# Patient Record
Sex: MALE | Race: BLACK OR AFRICAN AMERICAN | NOT HISPANIC OR LATINO | Employment: UNEMPLOYED | URBAN - METROPOLITAN AREA
[De-identification: names, ages, dates, MRNs, and addresses within clinical notes are randomized per-mention and may not be internally consistent; named-entity substitution may affect disease eponyms.]

---

## 2017-02-03 ENCOUNTER — HOSPITAL ENCOUNTER (EMERGENCY)
Facility: HOSPITAL | Age: 52
Discharge: HOME | End: 2017-02-03
Attending: EMERGENCY MEDICINE | Admitting: EMERGENCY MEDICINE

## 2017-02-03 ENCOUNTER — APPOINTMENT (OUTPATIENT)
Dept: GENERAL RADIOLOGY | Facility: HOSPITAL | Age: 52
End: 2017-02-03

## 2017-02-03 ENCOUNTER — APPOINTMENT (OUTPATIENT)
Dept: CT IMAGING | Facility: HOSPITAL | Age: 52
End: 2017-02-03

## 2017-02-03 VITALS
HEIGHT: 65 IN | RESPIRATION RATE: 18 BRPM | OXYGEN SATURATION: 99 % | SYSTOLIC BLOOD PRESSURE: 150 MMHG | HEART RATE: 72 BPM | TEMPERATURE: 98.3 F | BODY MASS INDEX: 30.82 KG/M2 | DIASTOLIC BLOOD PRESSURE: 88 MMHG | WEIGHT: 185 LBS
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
## 2017-02-03 DIAGNOSIS — S16.1XXA CERVICAL STRAIN, ACUTE, INITIAL ENCOUNTER: ICD-10-CM

## 2017-02-03 DIAGNOSIS — M54.9 ACUTE BACK PAIN, UNSPECIFIED BACK LOCATION, UNSPECIFIED BACK PAIN LATERALITY: ICD-10-CM

## 2017-02-03 PROCEDURE — 72125 CT NECK SPINE W/O DYE: CPT

## 2017-02-03 PROCEDURE — 72100 X-RAY EXAM L-S SPINE 2/3 VWS: CPT

## 2017-02-03 PROCEDURE — 99283 EMERGENCY DEPT VISIT LOW MDM: CPT

## 2017-02-03 RX ORDER — NAPROXEN 250 MG/1
500 TABLET ORAL ONCE
Status: COMPLETED | OUTPATIENT
Start: 2017-02-03 | End: 2017-02-03

## 2017-02-03 RX ORDER — HYDROCODONE BITARTRATE AND ACETAMINOPHEN 5; 325 MG/1; MG/1
1 TABLET ORAL EVERY 6 HOURS PRN
Qty: 10 TABLET | Refills: 0 | Status: SHIPPED | OUTPATIENT
Start: 2017-02-03 | End: 2021-04-28

## 2017-02-03 RX ADMIN — NAPROXEN 500 MG: 250 TABLET ORAL at 11:20

## 2017-02-03 NOTE — DISCHARGE INSTRUCTIONS
Return if problems. Information regarding Risks and Benefits When using Opioids and Other Controlled Substances to include Storage and Disposal of Medications    When considering the use of opioids and other controlled substances for the control of pain, anxiety, or for other medical purposes, you need to know of not only the benefits of these drugs but also of potential risks in using these drugs. These drugs, as well as more drugs, have beneficial uses; which is why their use is being considered in your   care, but they have risks involved in their use, too.    Opioids:  Opioids such as hydrocodone, oxycodone, hydromorphone, and codeine are pain relieving drugs, some more potent than others. They are most useful for moderate to more severe painful conditions. Risks include sedation, loss of coordination, decreased concentration, and decreased breathing with possibility of loss of consciousness or even death, especially if used in doses higher than prescribed. Improper usage can lead to addiction, tolerance, or overdose. In addition, many of these drugs are combined with acetaminophen (Tylenol) which can damage or destroy our liver when used excessively.  Alternatives to opioids are useful for mild to moderate pain and include ibuprofen (Motrin), naproxen (Aleve), aspirin, and acetaminophen (Tylenol). As with other drugs, these medications should be used according to directions on the label or from your doctor, as overuse can cause harm.    Benzodiazepines:  This group of drugs include: alprazolam (Xanax), diazepam (Valium), lorazepam (Ativan), and clonazepam (Klonopin). These drugs are used to control anxiety symptoms including anxiety and panic attacks. Risks using these drugs include: sedation, loss of coordination, decreased ability to concentrate, effects on memory, and decreased breathing with possibility of loss of consciousness or even death. Improper and prolonged usage can lead to addiction. An  alternative without addiction potential is hydroxyzine (Vistrail).    Other Controlled Substance:  This group includes Soma, Tramadol, stimulant drugs such as Ritalin, and others. Stimulant drugs are not medications that are prescribed by ER doctors. Soma and Tramadol have sedative and addictive affects similar to opioids with the same dangers mentioned with them.    Overdose:  If you or someone else are concerned that overdose has occurred, call 911 for transportation to the nearest hospital.    Storage and Disposal:  All medications need to be kept out of the reach of children or adults who cannot manage their own medicines. In addition, controlled substances can be targeted by criminals so extra precautions need to be taken to keep them in a safe, secure place. Any unused medications should be disposed of by flushing them down the toilet in the home setting or contact your local pharmacy.
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
## 2020-12-22 ENCOUNTER — APPOINTMENT (OUTPATIENT)
Dept: GENERAL RADIOLOGY | Facility: HOSPITAL | Age: 55
End: 2020-12-22

## 2020-12-22 ENCOUNTER — HOSPITAL ENCOUNTER (EMERGENCY)
Facility: HOSPITAL | Age: 55
Discharge: HOME | End: 2020-12-22
Attending: EMERGENCY MEDICINE | Admitting: EMERGENCY MEDICINE

## 2020-12-22 VITALS
SYSTOLIC BLOOD PRESSURE: 172 MMHG | OXYGEN SATURATION: 99 % | RESPIRATION RATE: 16 BRPM | HEART RATE: 79 BPM | HEIGHT: 65 IN | BODY MASS INDEX: 32.49 KG/M2 | DIASTOLIC BLOOD PRESSURE: 94 MMHG | TEMPERATURE: 97.7 F | WEIGHT: 195 LBS

## 2020-12-22 DIAGNOSIS — S39.012A LUMBAR STRAIN, INITIAL ENCOUNTER: Primary | ICD-10-CM
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
## 2020-12-22 DIAGNOSIS — R51.9 ACUTE NONINTRACTABLE HEADACHE, UNSPECIFIED HEADACHE TYPE: ICD-10-CM

## 2020-12-22 PROCEDURE — 72100 X-RAY EXAM L-S SPINE 2/3 VWS: CPT

## 2020-12-22 PROCEDURE — 99283 EMERGENCY DEPT VISIT LOW MDM: CPT

## 2020-12-22 RX ORDER — IBUPROFEN 800 MG/1
800 TABLET ORAL
Qty: 15 TABLET | Refills: 0 | Status: SHIPPED | OUTPATIENT
Start: 2020-12-22 | End: 2021-04-28

## 2020-12-22 RX ORDER — ACETAMINOPHEN 500 MG
1000 TABLET ORAL ONCE
Status: COMPLETED | OUTPATIENT
Start: 2020-12-22 | End: 2020-12-22

## 2020-12-22 RX ORDER — NAPROXEN 250 MG/1
500 TABLET ORAL ONCE
Status: COMPLETED | OUTPATIENT
Start: 2020-12-22 | End: 2020-12-22

## 2020-12-22 RX ORDER — ACETAMINOPHEN 500 MG
500 TABLET ORAL ONCE
Status: DISCONTINUED | OUTPATIENT
Start: 2020-12-22 | End: 2020-12-22 | Stop reason: HOSPADM

## 2020-12-22 RX ORDER — CYCLOBENZAPRINE HCL 10 MG
10 TABLET ORAL 3 TIMES DAILY PRN
Qty: 15 TABLET | Refills: 0 | Status: SHIPPED | OUTPATIENT
Start: 2020-12-22

## 2020-12-22 RX ADMIN — NAPROXEN 500 MG: 250 TABLET ORAL at 16:45

## 2020-12-22 RX ADMIN — ACETAMINOPHEN 1000 MG: 500 TABLET ORAL at 16:45
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
## 2021-04-28 ENCOUNTER — LAB (OUTPATIENT)
Dept: LAB | Facility: HOSPITAL | Age: 56
End: 2021-04-28

## 2021-04-28 ENCOUNTER — OFFICE VISIT (OUTPATIENT)
Dept: NEUROLOGY | Facility: CLINIC | Age: 56
End: 2021-04-28

## 2021-04-28 VITALS
SYSTOLIC BLOOD PRESSURE: 150 MMHG | BODY MASS INDEX: 33.32 KG/M2 | OXYGEN SATURATION: 98 % | DIASTOLIC BLOOD PRESSURE: 80 MMHG | WEIGHT: 200 LBS | HEIGHT: 65 IN | TEMPERATURE: 96.9 F | HEART RATE: 82 BPM

## 2021-04-28 DIAGNOSIS — G43.719 INTRACTABLE CHRONIC MIGRAINE WITHOUT AURA AND WITHOUT STATUS MIGRAINOSUS: ICD-10-CM

## 2021-04-28 DIAGNOSIS — G43.719 INTRACTABLE CHRONIC MIGRAINE WITHOUT AURA AND WITHOUT STATUS MIGRAINOSUS: Primary | ICD-10-CM

## 2021-04-28 DIAGNOSIS — R42 DIZZINESS: ICD-10-CM

## 2021-04-28 PROCEDURE — 99203 OFFICE O/P NEW LOW 30 MIN: CPT | Performed by: NURSE PRACTITIONER

## 2021-04-28 RX ORDER — LIDOCAINE 50 MG/G
PATCH TOPICAL
COMMUNITY
Start: 2021-04-16

## 2021-04-28 RX ORDER — CHLORTHALIDONE 25 MG/1
25 TABLET ORAL DAILY
COMMUNITY
Start: 2021-03-20

## 2021-04-28 RX ORDER — ATORVASTATIN CALCIUM 20 MG/1
20 TABLET, FILM COATED ORAL NIGHTLY
COMMUNITY
Start: 2021-04-20

## 2021-04-28 RX ORDER — FLUTICASONE PROPIONATE 50 MCG
2 SPRAY, SUSPENSION (ML) NASAL DAILY
COMMUNITY
Start: 2021-04-16

## 2021-04-28 RX ORDER — VERAPAMIL HYDROCHLORIDE 240 MG/1
240 CAPSULE, EXTENDED RELEASE ORAL DAILY
COMMUNITY
Start: 2021-02-22

## 2021-04-28 RX ORDER — SUMATRIPTAN 100 MG/1
TABLET, FILM COATED ORAL
COMMUNITY
Start: 2021-04-16 | End: 2022-03-09
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
## 2021-04-29 LAB
ALBUMIN SERPL-MCNC: 4.3 G/DL (ref 3.5–5.2)
ALBUMIN/GLOB SERPL: 2 G/DL
ALP SERPL-CCNC: 74 U/L (ref 39–117)
ALT SERPL-CCNC: 21 U/L (ref 1–41)
AST SERPL-CCNC: 21 U/L (ref 1–40)
BILIRUB SERPL-MCNC: 0.2 MG/DL (ref 0–1.2)
BUN SERPL-MCNC: 16 MG/DL (ref 6–20)
BUN/CREAT SERPL: 15.7 (ref 7–25)
CALCIUM SERPL-MCNC: 9.4 MG/DL (ref 8.6–10.5)
CHLORIDE SERPL-SCNC: 105 MMOL/L (ref 98–107)
CO2 SERPL-SCNC: 23.9 MMOL/L (ref 22–29)
CREAT SERPL-MCNC: 1.02 MG/DL (ref 0.76–1.27)
CRP SERPL-MCNC: <0.3 MG/DL (ref 0–0.5)
ERYTHROCYTE [SEDIMENTATION RATE] IN BLOOD BY WESTERGREN METHOD: 9 MM/HR (ref 0–20)
GLOBULIN SER CALC-MCNC: 2.1 GM/DL
GLUCOSE SERPL-MCNC: 74 MG/DL (ref 65–99)
POTASSIUM SERPL-SCNC: 4.6 MMOL/L (ref 3.5–5.2)
PROT SERPL-MCNC: 6.4 G/DL (ref 6–8.5)
SODIUM SERPL-SCNC: 139 MMOL/L (ref 136–145)

## 2021-05-04 ENCOUNTER — TELEPHONE (OUTPATIENT)
Dept: NEUROLOGY | Facility: CLINIC | Age: 56
End: 2021-05-04

## 2021-05-04 NOTE — TELEPHONE ENCOUNTER
Provider: HUTCHINSON  Caller: PT  Relationship to Patient: SELF  Phone Number: [phone]  Reason for Call: PT R/Y/C    PLEASE CALL    THANK YOU.

## 2021-06-02 ENCOUNTER — HOSPITAL ENCOUNTER (OUTPATIENT)
Dept: MRI IMAGING | Facility: HOSPITAL | Age: 56
Discharge: HOME | End: 2021-06-02

## 2021-06-02 DIAGNOSIS — G43.719 INTRACTABLE CHRONIC MIGRAINE WITHOUT AURA AND WITHOUT STATUS MIGRAINOSUS: ICD-10-CM

## 2021-06-02 DIAGNOSIS — R42 DIZZINESS: ICD-10-CM

## 2021-06-02 PROCEDURE — 70544 MR ANGIOGRAPHY HEAD W/O DYE: CPT

## 2021-06-02 PROCEDURE — 70551 MRI BRAIN STEM W/O DYE: CPT

## 2021-06-08 ENCOUNTER — TELEPHONE (OUTPATIENT)
Dept: NEUROLOGY | Facility: CLINIC | Age: 56
End: 2021-06-08

## 2021-06-08 NOTE — TELEPHONE ENCOUNTER
----- Message from DELVIN Tang sent at 6/7/2021  7:52 AM EDT -----  Please let patient know both MRI of the brain and MRA of the brain read as normal per radiology

## 2021-08-30 ENCOUNTER — OFFICE VISIT (OUTPATIENT)
Dept: NEUROLOGY | Facility: CLINIC | Age: 56
End: 2021-08-30

## 2021-08-30 VITALS
SYSTOLIC BLOOD PRESSURE: 150 MMHG | HEIGHT: 65 IN | BODY MASS INDEX: 33.49 KG/M2 | TEMPERATURE: 97.2 F | HEART RATE: 79 BPM | DIASTOLIC BLOOD PRESSURE: 80 MMHG | WEIGHT: 201 LBS | OXYGEN SATURATION: 98 %

## 2021-08-30 DIAGNOSIS — G43.719 INTRACTABLE CHRONIC MIGRAINE WITHOUT AURA AND WITHOUT STATUS MIGRAINOSUS: ICD-10-CM

## 2021-08-30 PROCEDURE — 99213 OFFICE O/P EST LOW 20 MIN: CPT | Performed by: NURSE PRACTITIONER

## 2021-08-30 RX ORDER — LORATADINE 10 MG/1
10 TABLET ORAL DAILY
COMMUNITY
Start: 2021-07-07 | End: 2022-03-09

## 2021-08-30 RX ORDER — AMITRIPTYLINE HYDROCHLORIDE 10 MG/1
10 TABLET, FILM COATED ORAL NIGHTLY
Qty: 30 TABLET | Refills: 3 | Status: SHIPPED | OUTPATIENT
Start: 2021-08-30 | End: 2021-12-30

## 2021-08-30 NOTE — PROGRESS NOTES
Subjective:     Patient ID: Miky Curiel is a 55 y.o. male.    CC:   Chief Complaint   Patient presents with   • Migraine       HPI:   History of Present Illness     Mr. Curiel is here today for follow up.  I first saw 4/28/2021 for headaches    He told me that he has had headaches ongoing for a couple years at least.  He had been told in the past that these were migraine and possibly cluster headaches.  He said that typically when he has a headache he will have pain behind either eye that will start as a throbbing sensation and then spread to holoacranial discomfort.  He at times also has associated photo and phonophobia but he never has any nausea or vomiting.   his headaches can be episodic, he may go a month or so without having any headaches, he may have 1 or 2 headaches per month and he may have 3 to 4-week.  He had never identified any patterns or triggers to his headaches.  He had not noticed that his headaches are worsened during certain months of the year.  He does at times have watering eyes but he has this bilaterally as he complains of chronic allergies.  He has been on verapamil for quite some time for both blood pressure and headache control, a couple months ago his primary care did raise the dose of his verapamil to 240 mg daily.  He had also taken propranolol in the past.  He continue to have sporadic headaches and he had not really noticed much of an improvement overall.  He takes sumatriptan as needed for his headaches, he is prescribed 100 mg pills but he breaks these in thirds.  Typically one third of 100 mg sumatriptan will abort his headache.  He denied any ptosis or facial droop associated with his headaches.  He has had an eye exam recently and was told everything was normal  He has occasional dizziness but this is rare.  He denied any confusional episodes, seizures, dysarthria or stroke symptoms.  He has some tingling in his hands that has been ongoing for quite some time, he had a  previous EMG that apparently showed carpal tunnel, he does not really wear his wrist splints often.  He denied any syncopal episodes, tremor weakness or falls.  After last visit I did order MRI of the brain, this has since been done as well as MRA and both were read as normal per radiology.  I sent in a prescription for Emgality for him, I was unaware until he returns today that this was denied by his insurance.  He continues to have headaches with no changes.  He denies any new symptoms     The following portions of the patient's history were reviewed and updated as appropriate: allergies, current medications, past family history, past medical history, past social history, past surgical history and problem list.    Past Medical History:   Diagnosis Date   • Cluster headache    • Hyperlipidemia    • Hypertension    • Kidney stone        Past Surgical History:   Procedure Laterality Date   • ANKLE SURGERY     • CYSTOSCOPY BLADDER STONE LITHOTRIPSY         Social History     Socioeconomic History   • Marital status: Single     Spouse name: Not on file   • Number of children: Not on file   • Years of education: Not on file   • Highest education level: Not on file   Tobacco Use   • Smoking status: Light Tobacco Smoker     Types: Cigars   • Smokeless tobacco: Never Used   Vaping Use   • Vaping Use: Never used   Substance and Sexual Activity   • Alcohol use: No   • Drug use: No   • Sexual activity: Yes       Family History   Problem Relation Age of Onset   • Cancer Mother    • Dementia Father    • Hypertension Father    • Hyperlipidemia Father    • Heart failure Father         Review of Systems   Constitutional: Negative.    Eyes: Negative.    Respiratory: Negative.    Cardiovascular: Negative.    Gastrointestinal: Negative.    Endocrine: Negative.    Genitourinary: Negative.    Musculoskeletal: Negative.    Skin: Negative.    Allergic/Immunologic: Negative.    Neurological: Positive for headaches. Negative for dizziness,  "tremors, seizures, syncope, facial asymmetry, speech difficulty, weakness and light-headedness.   Hematological: Negative.    Psychiatric/Behavioral: Negative.         Objective:  /80   Pulse 79   Temp 97.2 °F (36.2 °C)   Ht 165.1 cm (65\")   Wt 91.2 kg (201 lb)   SpO2 98%   BMI 33.45 kg/m²     Neurologic Exam     Mental Status   Oriented to person, place, and time.   Patient is alert and oriented, speech clear and articulate     Cranial Nerves   Cranial nerves II through XII intact.     CN III, IV, VI   Pupils are equal, round, and reactive to light.    Motor Exam   Muscle bulk: normal  Overall muscle tone: normal  Right arm pronator drift: absent  Left arm pronator drift: absent    Strength   Strength 5/5 throughout.     Gait, Coordination, and Reflexes     Gait  Gait: normal    Coordination   Romberg: negative  Finger to nose coordination: normal    Tremor   Resting tremor: absent  Intention tremor: absent  Action tremor: absent    Reflexes   Reflexes 2+ except as noted.       Physical Exam  Vitals reviewed.   Constitutional:       General: He is not in acute distress.     Appearance: He is well-developed. He is obese. He is not ill-appearing or toxic-appearing.   HENT:      Head: Normocephalic and atraumatic.      Mouth/Throat:      Mouth: Mucous membranes are moist.   Eyes:      General: No scleral icterus.     Extraocular Movements: Extraocular movements intact.      Conjunctiva/sclera: Conjunctivae normal.      Pupils: Pupils are equal, round, and reactive to light.   Pulmonary:      Effort: Pulmonary effort is normal. No respiratory distress.   Musculoskeletal:      Cervical back: Normal range of motion and neck supple.   Skin:     General: Skin is warm.      Capillary Refill: Capillary refill takes less than 2 seconds.   Neurological:      General: No focal deficit present.      Mental Status: He is alert and oriented to person, place, and time.      Coordination: Finger-Nose-Finger Test and " Romberg Test normal.      Gait: Gait is intact.      Deep Tendon Reflexes: Strength normal.   Psychiatric:         Mood and Affect: Mood normal.         Behavior: Behavior normal.         Thought Content: Thought content normal.         Judgment: Judgment normal.         Assessment/Plan:       Diagnoses and all orders for this visit:    1. Intractable chronic migraine without aura and without status migrainosus  -     galcanezumab-gnlm (EMGALITY) 120 MG/ML auto-injector pen; Inject 1 mL under the skin into the appropriate area as directed Every 30 (Thirty) Days.  Dispense: 1.12 mL; Refill: 11    Other orders  -     amitriptyline (ELAVIL) 10 MG tablet; Take 1 tablet by mouth Every Night.  Dispense: 30 tablet; Refill: 3    Patient has migraine and some features of cluster headache.  At last visit I prescribed Emgality, it appears that our notes were not sent for approval and I was unaware of this until today.  Going to reorder Emgality for both migraine and cluster, 120 mg monthly.  He has had an MRI and MRA of the brain which were read as normal  I have encouraged him to notify me if he is unable to  the prescription, we are going to work on approval with his insurance today  Follow-up here in the clinic in 3 months or sooner if needed  We reviewed possible headache triggers, stress relief techniques, and alternative treatments for headaches. The patient was in understanding and all questions were addressed. We reviewed the diagnosis and treatment plan and medication side effect profile. The patient will follow up as scheduled.             Reviewed medications, potential side effects and signs and symptoms to report. Discussed risk versus benefits of treatment plan with patient and/or family-including medications, labs and radiology that may be ordered. Addressed questions and concerns during visit. Patient and/or family verbalized understanding and agree with plan.    AS THE PROVIDER, I PERSONALLY WORE PPE  DURING ENTIRE FACE TO FACE ENCOUNTER IN CLINIC WITH THE PATIENT. PATIENT ALSO WORE PPE DURING ENTIRE FACE TO FACE ENCOUNTER EXCEPT FOR A MAX OF 30 SECONDS DURING NEUROLOGICAL EVALUATION OF CRANIAL NERVES AND THEN MASK WAS PLACED BACK OVER PATIENT FACE FOR REMAINDER OF VISIT. I WASHED MY HANDS BEFORE AND AFTER VISIT.    During this visit the following were done:  Labs Reviewed []    Labs Ordered []    Radiology Reports Reviewed []    Radiology Ordered []    PCP Records Reviewed []    Referring Provider Records Reviewed []    ER Records Reviewed []    Hospital Records Reviewed []    History Obtained From Family []    Radiology Images Reviewed []    Other Reviewed []    Records Requested []      Edwina Garg, DELVIN  8/30/2021

## 2021-08-31 ENCOUNTER — PRIOR AUTHORIZATION (OUTPATIENT)
Dept: NEUROLOGY | Facility: CLINIC | Age: 56
End: 2021-08-31

## 2021-08-31 NOTE — TELEPHONE ENCOUNTER
Pa submitted for Emgality KEY: [id number]   Per Emili he has tried and failed propranolol, lisinopril, verapamil, and is currently on amitriptyline.

## 2021-09-29 NOTE — TELEPHONE ENCOUNTER
----- Message from DELVIN Tang sent at 4/30/2021  8:41 AM EDT -----  Please let patient know his labs were in normal range.  His inflammatory markers were unremarkable   Trilobed Flap Text: The defect edges were debeveled with a #15 scalpel blade.  Given the location of the defect and the proximity to free margins a trilobed flap was deemed most appropriate.  Using a sterile surgical marker, an appropriate trilobed flap drawn around the defect.    The area thus outlined was incised deep to adipose tissue with a #15 scalpel blade.  The skin margins were undermined to an appropriate distance in all directions utilizing iris scissors.

## 2021-11-09 ENCOUNTER — OFFICE VISIT (OUTPATIENT)
Dept: NEUROLOGY | Facility: CLINIC | Age: 56
End: 2021-11-09

## 2021-11-09 VITALS
OXYGEN SATURATION: 99 % | BODY MASS INDEX: 33.32 KG/M2 | TEMPERATURE: 98.3 F | HEART RATE: 91 BPM | DIASTOLIC BLOOD PRESSURE: 80 MMHG | WEIGHT: 200 LBS | HEIGHT: 65 IN | SYSTOLIC BLOOD PRESSURE: 130 MMHG

## 2021-11-09 DIAGNOSIS — G43.709 CHRONIC MIGRAINE WITHOUT AURA WITHOUT STATUS MIGRAINOSUS, NOT INTRACTABLE: Primary | ICD-10-CM

## 2021-11-09 DIAGNOSIS — R20.2 PARESTHESIA OF LEFT UPPER LIMB: ICD-10-CM

## 2021-11-09 PROCEDURE — 99213 OFFICE O/P EST LOW 20 MIN: CPT | Performed by: NURSE PRACTITIONER

## 2021-11-09 RX ORDER — LISINOPRIL 20 MG/1
20 TABLET ORAL DAILY
COMMUNITY
Start: 2021-10-23

## 2021-11-09 RX ORDER — SUMATRIPTAN 25 MG/1
25 TABLET, FILM COATED ORAL ONCE AS NEEDED
COMMUNITY
Start: 2021-10-26 | End: 2022-12-01

## 2021-11-09 NOTE — PROGRESS NOTES
Subjective:     Patient ID: Miky Curiel is a 55 y.o. male.    CC:   Chief Complaint   Patient presents with   • Migraine       HPI:   History of Present Illness     Mr. Curiel is here today for follow up.  I first saw 4/28/2021 for headaches, his last visit was 8/30/21     He told me that he has had headaches ongoing for a couple years at least.  He had been told in the past that these were migraine and possibly cluster headaches.  He said that typically when he has a headache he will have pain behind either eye that will start as a throbbing sensation and then spread to holoacranial discomfort.  He at times also has associated photo and phonophobia but he never has any nausea or vomiting.   his headaches can be episodic, he may go a month or so without having any headaches, he may have 1 or 2 headaches per month and he may have 3 to 4-week.  He had never identified any patterns or triggers to his headaches.  He had not noticed that his headaches are worsened during certain months of the year.  He does at times have watering eyes but he has this bilaterally as he complains of chronic allergies.  He has been on verapamil for quite some time for both blood pressure and headache control, a couple months ago his primary care did raise the dose of his verapamil to 240 mg daily.  He had also taken propranolol in the past.  He continued to have sporadic headaches and he had not really noticed much of an improvement overall.  He takes sumatriptan as needed for his headaches, he is prescribed 100 mg pills but he breaks these in thirds.  Typically one third of 100 mg sumatriptan will abort his headache.  He denied any ptosis or facial droop associated with his headaches.  He has had an eye exam recently and was told everything was normal  He has occasional dizziness but this is rare.  He denied any confusional episodes, seizures, dysarthria or stroke symptoms.      He has some tingling in his hands that has been  ongoing for quite some time, he had a previous EMG that apparently showed carpal tunnel, he does not really wear his wrist splints often.  He denied any syncopal episodes, tremor weakness or falls.    I did order MRI of the brain, this has since been done as well as MRA and both were read as normal per radiology.    Fortunately at last visit he had not started Emgality injections that I prescribed at first visit due to an insurance issue.  We did get this approved, he comes in today telling me that he took 1 injection and felt significantly better, his only had 4 mild migraines since he was here last.  Unfortunately his last injection the pen had a malfunction, he has brought that pen into me today it appears it will not come out of the locked position.  He does feel like the medication has been very helpful.       He is complaining of a bit more tingling in his left arm, this has been an ongoing issue for few years, he had an EMG about 4 to 5 years ago that showed mild carpal tunnel but he has been experiencing more tingling up above his elbow as well, no weakness in the hand      The following portions of the patient's history were reviewed and updated as appropriate: allergies, current medications, past family history, past medical history, past social history, past surgical history and problem list.    Past Medical History:   Diagnosis Date   • Cluster headache    • Hyperlipidemia    • Hypertension    • Kidney stone        Past Surgical History:   Procedure Laterality Date   • ANKLE SURGERY     • CYSTOSCOPY BLADDER STONE LITHOTRIPSY         Social History     Socioeconomic History   • Marital status: Single   Tobacco Use   • Smoking status: Light Tobacco Smoker     Types: Cigars   • Smokeless tobacco: Never Used   Vaping Use   • Vaping Use: Never used   Substance and Sexual Activity   • Alcohol use: No   • Drug use: No   • Sexual activity: Yes       Family History   Problem Relation Age of Onset   • Cancer  "Mother    • Dementia Father    • Hypertension Father    • Hyperlipidemia Father    • Heart failure Father         Review of Systems   Constitutional: Negative.    Eyes: Negative.    Respiratory: Negative.    Cardiovascular: Negative.    Gastrointestinal: Negative.    Genitourinary: Negative.    Musculoskeletal: Negative.    Skin: Negative.    Allergic/Immunologic: Negative.    Neurological: Positive for numbness and headaches. Negative for dizziness, tremors, seizures, syncope, facial asymmetry, speech difficulty, weakness and light-headedness.   Hematological: Negative.    Psychiatric/Behavioral: Negative.         Objective:  /80   Pulse 91   Temp 98.3 °F (36.8 °C)   Ht 165.1 cm (65\")   Wt 90.7 kg (200 lb)   SpO2 99%   BMI 33.28 kg/m²     Neurologic Exam     Mental Status   Oriented to person, place, and time.   Patient is alert and oriented, speech clear and articulate     Cranial Nerves   Cranial nerves II through XII intact.     CN III, IV, VI   Pupils are equal, round, and reactive to light.    Motor Exam   Muscle bulk: normal  Right arm pronator drift: absent  Left arm pronator drift: absent    Strength   Strength 5/5 throughout.     Gait, Coordination, and Reflexes     Gait  Gait: normal    Coordination   Finger to nose coordination: normal    Tremor   Resting tremor: absent  Intention tremor: absent  Action tremor: absent    Reflexes   Reflexes 2+ except as noted. Positive Tinel left wrist, negative Tinel left elbow, no  strength weakness on the left       Physical Exam  Vitals reviewed.   Constitutional:       General: He is not in acute distress.     Appearance: He is well-developed. He is obese. He is not ill-appearing or toxic-appearing.   HENT:      Head: Normocephalic and atraumatic.      Mouth/Throat:      Mouth: Mucous membranes are moist.   Eyes:      General: No scleral icterus.     Extraocular Movements: Extraocular movements intact.      Conjunctiva/sclera: Conjunctivae normal.    "   Pupils: Pupils are equal, round, and reactive to light.   Pulmonary:      Effort: Pulmonary effort is normal. No respiratory distress.   Musculoskeletal:      Cervical back: Normal range of motion and neck supple.   Skin:     General: Skin is warm.      Capillary Refill: Capillary refill takes less than 2 seconds.   Neurological:      Mental Status: He is alert and oriented to person, place, and time.      Coordination: Finger-Nose-Finger Test normal.      Gait: Gait is intact.      Deep Tendon Reflexes: Strength normal.   Psychiatric:         Mood and Affect: Mood normal.         Behavior: Behavior normal.         Thought Content: Thought content normal.         Judgment: Judgment normal.         Assessment/Plan:       Diagnoses and all orders for this visit:    1. Chronic migraine without aura without status migrainosus, not intractable (Primary)  Comments:  Continue Emgality, sample given him today lot number [id number] expiration 1/2023      2. Paresthesia of left upper limb  -     EMG & Nerve Conduction Test; Future    Mr. Curiel feels like the Emgality has been very beneficial, he is only had 1 injection, his last auto injector malfunctioned, I have given him a sample today to make up for that dose, he will continue monthly injections  Is having a bit more tingling in his left hand and arm, this has been a long-term issue but seems to be more pronounced at times, will check EMG left upper extremity  We will see him back in about 4 months or sooner if needed, if he has any questions concerns or problems prior to follow-up appointment he is encouraged to notify our office ASAP         Reviewed medications, potential side effects and signs and symptoms to report. Discussed risk versus benefits of treatment plan with patient and/or family-including medications, labs and radiology that may be ordered. Addressed questions and concerns during visit. Patient and/or family verbalized understanding and agree with  plan.    AS THE PROVIDER, I PERSONALLY WORE PPE DURING ENTIRE FACE TO FACE ENCOUNTER IN CLINIC WITH THE PATIENT. PATIENT ALSO WORE PPE DURING ENTIRE FACE TO FACE ENCOUNTER EXCEPT FOR A MAX OF 30 SECONDS DURING NEUROLOGICAL EVALUATION OF CRANIAL NERVES AND THEN MASK WAS PLACED BACK OVER PATIENT FACE FOR REMAINDER OF VISIT. I WASHED MY HANDS BEFORE AND AFTER VISIT.    During this visit the following were done:  Labs Reviewed []    Labs Ordered []    Radiology Reports Reviewed []    Radiology Ordered []    PCP Records Reviewed []    Referring Provider Records Reviewed []    ER Records Reviewed []    Hospital Records Reviewed []    History Obtained From Family []    Radiology Images Reviewed []    Other Reviewed []    Records Requested []      Edwina Garg, DELVIN  11/9/2021

## 2021-12-30 RX ORDER — AMITRIPTYLINE HYDROCHLORIDE 10 MG/1
TABLET, FILM COATED ORAL
Qty: 30 TABLET | Refills: 3 | Status: SHIPPED | OUTPATIENT
Start: 2021-12-30 | End: 2022-05-04

## 2021-12-30 NOTE — TELEPHONE ENCOUNTER
Rx Refill Note  Requested Prescriptions     Pending Prescriptions Disp Refills   • amitriptyline (ELAVIL) 10 MG tablet [Pharmacy Med Name: AMITRIPTYLINE HCL 10 MG TAB] 30 tablet 3     Sig: TAKE 1 TABLET BY MOUTH EVERY DAY AT NIGHT      Last office visit with prescribing clinician: 11/9/2021      Next office visit with prescribing clinician: 3/9/2022            Mickie Esteban MA  12/30/21, 12:56 EST

## 2022-01-11 ENCOUNTER — HOSPITAL ENCOUNTER (OUTPATIENT)
Dept: NEUROLOGY | Facility: HOSPITAL | Age: 57
Discharge: HOME | End: 2022-01-11
Admitting: NURSE PRACTITIONER

## 2022-01-11 ENCOUNTER — APPOINTMENT (OUTPATIENT)
Dept: NEUROLOGY | Facility: HOSPITAL | Age: 57
End: 2022-01-11

## 2022-01-11 DIAGNOSIS — R20.2 PARESTHESIA OF LEFT UPPER LIMB: ICD-10-CM

## 2022-01-11 PROCEDURE — 95886 MUSC TEST DONE W/N TEST COMP: CPT

## 2022-01-11 PROCEDURE — 95909 NRV CNDJ TST 5-6 STUDIES: CPT

## 2022-01-12 ENCOUNTER — TELEPHONE (OUTPATIENT)
Dept: NEUROLOGY | Facility: CLINIC | Age: 57
End: 2022-01-12

## 2022-01-12 DIAGNOSIS — G56.03 BILATERAL CARPAL TUNNEL SYNDROME: Primary | ICD-10-CM

## 2022-01-12 NOTE — PROGRESS NOTES
Please let pt know his nerve/muscle test showed severe carpal tunnel in both hands, I would like him to see ortho, referral placed.

## 2022-01-12 NOTE — TELEPHONE ENCOUNTER
----- Message from DELVIN Tang sent at 1/12/2022  8:03 AM EST -----  Please let pt know his nerve/muscle test showed severe carpal tunnel in both hands, I would like him to see ortho, referral placed.

## 2022-01-12 NOTE — TELEPHONE ENCOUNTER
Patient notified of message and results. He is fine to have referral to ortho. I told him someone would call him to schedule this appointment

## 2022-01-17 ENCOUNTER — PRIOR AUTHORIZATION (OUTPATIENT)
Dept: NEUROLOGY | Facility: CLINIC | Age: 57
End: 2022-01-17

## 2022-01-18 ENCOUNTER — OFFICE VISIT (OUTPATIENT)
Dept: ORTHOPEDIC SURGERY | Facility: CLINIC | Age: 57
End: 2022-01-18

## 2022-01-18 VITALS
HEIGHT: 65 IN | DIASTOLIC BLOOD PRESSURE: 95 MMHG | SYSTOLIC BLOOD PRESSURE: 168 MMHG | WEIGHT: 205 LBS | BODY MASS INDEX: 34.16 KG/M2

## 2022-01-18 DIAGNOSIS — G56.01 CARPAL TUNNEL SYNDROME ON RIGHT: ICD-10-CM

## 2022-01-18 DIAGNOSIS — G56.02 CARPAL TUNNEL SYNDROME ON LEFT: Primary | ICD-10-CM

## 2022-01-18 DIAGNOSIS — M79.602 LEFT ARM PAIN: ICD-10-CM

## 2022-01-18 PROCEDURE — 99204 OFFICE O/P NEW MOD 45 MIN: CPT | Performed by: PHYSICIAN ASSISTANT

## 2022-01-18 NOTE — PROGRESS NOTES
[facility] Note        Subjective     Pain of the Left Wrist and Pain of the Right Wrist      HPI    Miky Curiel is a 56 y.o. male. This is a very pleasant RHD patient presenting today to discuss his left arm pain, numbness and tingling.  No trauma or injury.  It is constant but worse with driving, feels asleep at night.   Symptoms for several months.  He had EMG  With severe carpal tunnel bilaterally.  He reports minimal symptoms on the right.  Here for further evaluation and treatment recommendations     Past Medical History:   Diagnosis Date   • Cluster headache    • Hyperlipidemia    • Hypertension    • Kidney stone       Past Surgical History:   Procedure Laterality Date   • ANKLE SURGERY     • CYSTOSCOPY BLADDER STONE LITHOTRIPSY        Family History   Problem Relation Age of Onset   • Cancer Mother    • Dementia Father    • Hypertension Father    • Hyperlipidemia Father    • Heart failure Father      Social History     Socioeconomic History   • Marital status: Single   Tobacco Use   • Smoking status: Light Tobacco Smoker     Types: Cigars   • Smokeless tobacco: Never Used   Vaping Use   • Vaping Use: Never used   Substance and Sexual Activity   • Alcohol use: No   • Drug use: No   • Sexual activity: Yes      Current Outpatient Medications on File Prior to Visit   Medication Sig Dispense Refill   • amitriptyline (ELAVIL) 10 MG tablet TAKE 1 TABLET BY MOUTH EVERY DAY AT NIGHT 30 tablet 3   • atorvastatin (LIPITOR) 20 MG tablet Take 20 mg by mouth Every Night.     • chlorthalidone (HYGROTON) 25 MG tablet Take 25 mg by mouth Daily.     • cyclobenzaprine (FLEXERIL) 10 MG tablet Take 1 tablet by mouth 3 (Three) Times a Day As Needed for Muscle Spasms. 15 tablet 0   • diclofenac sodium (VOTAREN XR) 100 MG 24 hr tablet TAKE ONE TABLET BY MOUTH DAILY AS NEEDED FOR SEVERE PAIN     • fluticasone (FLONASE) 50 MCG/ACT nasal spray 2 sprays by Each Nare route Daily.     •  "galcanezumab-gnlm (EMGALITY) 120 MG/ML auto-injector pen Inject 1 mL under the skin into the appropriate area as directed Every 30 (Thirty) Days. 1.12 mL 11   • lidocaine (LIDODERM) 5 % APPLY 1 PATCH AND LEAVE IN PLACE FOR 12 HOURS, THEN REMOVE AND LEAVE OFF FOR 12 HOURS     • lisinopril (PRINIVIL,ZESTRIL) 20 MG tablet      • SUMAtriptan (IMITREX) 100 MG tablet TAKE 1 TABLET FOR MIGRAINE RELIEF. MAY REPEAT 2 HOURS LATER. MAXIMUM 200MG/DAY.     • SUMAtriptan (IMITREX) 25 MG tablet      • verapamil ER (VERELAN) 240 MG 24 hr capsule Take 240 mg by mouth Daily.     • loratadine (CLARITIN) 10 MG tablet Take 10 mg by mouth Daily.       No current facility-administered medications on file prior to visit.      No Known Allergies       Review of Systems   Constitutional: Negative.    HENT: Negative.    Eyes: Negative.    Respiratory: Negative.    Cardiovascular: Negative.    Gastrointestinal: Negative.    Endocrine: Negative.    Genitourinary: Negative.    Musculoskeletal: Positive for arthralgias.   Skin: Negative.    Allergic/Immunologic: Negative.    Neurological: Negative.    Hematological: Negative.    Psychiatric/Behavioral: Negative.         I reviewed the patient's chief complaint, history of present illness, review of systems, past medical history, surgical history, family history, social history, medications and allergy list.        Objective      Physical Exam  /95   Ht 165.1 cm (65\")   Wt 93 kg (205 lb)   BMI 34.11 kg/m²     Body mass index is 34.11 kg/m².    General  Mental Status - alert  General Appearance - cooperative, well groomed, not in acute distress  Orientation - Oriented X3  Build & Nutrition - well developed and well nourished  Posture - normal posture  Gait - normal       Ortho Exam  LUE exam:  Normal motion and strength of the shoulder, elbow, wrist and hand.  No tenderness.  Patient able tomake a composite fist.  Sensation intact to light touch throughout LUE.  Negative Tinel's at the " wrist and elbow.  Negative Phalen's.      Imaging/Studies  Imaging Results (Last 24 Hours)     ** No results found for the last 24 hours. **            Assessment    Assessment:  1. Carpal tunnel syndrome on left    2. Carpal tunnel syndrome on right    3. Left arm pain          Plan:  1. Recommend over-the-counter medication as needed for discomfort  2. Left arm pain with severe carpal tunnel syndrome.  I reviewed EMG from 1/11/22, clinical findings, past and current treatment with the patient.  His EMG shows severe carpal tunnel, no evidence of cervical radiculopathy or plexopathy.  I explained that the CT would not cause symptoms all of the way up the arm.  I explained the median nerve distribution.  We discussed treatment including trial of injection as diagnostic and therapeutic, bracing.  Plan today is left CT injection. We will get him a cock up wrist splint.  RTC 6 weeks or sooner if needed.  3. Right severe CTS.  Patient is mildly symptomatic.  He does not think he needs any treatment at this time.    I discussed with the patient the potential benefits of performing a therapeutic injection of the left carpal tunnel as well as potential risks including but not limited to infection, swelling, pain, bleeding, bruising, nerve/vessel damage, skin color changes, transient elevation in blood glucose levels, and fat atrophy. After informed consent and verifying correct patient, procedure site, and type of procedure, the area was prepped with Hibiclens, ethyl chloride was used to numb the skin. Via the volar approach, 1cc of 1% lidocaine and  20mg/ml of Kenalog were injected into the left carpal tunnel. The patient tolerated the procedure well. There were no complications.     History, diagnosis and treatment plan discussed with Dr. Salazar.                Elizabeth Houston PA-C  01/19/22  11:48 EST

## 2022-03-09 ENCOUNTER — OFFICE VISIT (OUTPATIENT)
Dept: NEUROLOGY | Facility: CLINIC | Age: 57
End: 2022-03-09

## 2022-03-09 VITALS
DIASTOLIC BLOOD PRESSURE: 88 MMHG | HEIGHT: 65 IN | WEIGHT: 203 LBS | HEART RATE: 90 BPM | TEMPERATURE: 95.3 F | OXYGEN SATURATION: 98 % | BODY MASS INDEX: 33.82 KG/M2 | SYSTOLIC BLOOD PRESSURE: 150 MMHG

## 2022-03-09 DIAGNOSIS — G43.709 CHRONIC MIGRAINE WITHOUT AURA WITHOUT STATUS MIGRAINOSUS, NOT INTRACTABLE: Primary | ICD-10-CM

## 2022-03-09 DIAGNOSIS — M54.2 NECK PAIN: ICD-10-CM

## 2022-03-09 DIAGNOSIS — R20.2 PARESTHESIA OF LEFT UPPER LIMB: ICD-10-CM

## 2022-03-09 PROCEDURE — 99213 OFFICE O/P EST LOW 20 MIN: CPT | Performed by: NURSE PRACTITIONER

## 2022-03-09 NOTE — PROGRESS NOTES
Subjective:     Patient ID: Miky Curiel is a 56 y.o. male.    CC:   Chief Complaint   Patient presents with   • Migraine       HPI:   History of Present Illness     Mr. Curiel is here today for follow up.  I first saw 4/28/2021 for headaches, his last visit was 8/30/21     He told me that he has had headaches ongoing for a couple years at least.  He had been told in the past that these were migraine and possibly cluster headaches.  He said that typically when he has a headache he will have pain behind either eye that will start as a throbbing sensation and then spread to holoacranial discomfort.  He at times also has associated photo and phonophobia but he never has any nausea or vomiting.   his headaches can be episodic, he may go a month or so without having any headaches, he may have 1 or 2 headaches per month and he may have 3 to 4-week.  He had never identified any patterns or triggers to his headaches.  He had not noticed that his headaches are worsened during certain months of the year.  He does at times have watering eyes but he has this bilaterally as he complains of chronic allergies.  He has been on verapamil for quite some time for both blood pressure and headache control, a couple months ago his primary care did raise the dose of his verapamil to 240 mg daily.  He had also taken propranolol in the past.  He continued to have sporadic headaches and he had not really noticed much of an improvement overall.  He takes sumatriptan as needed for his headaches, he is prescribed 100 mg pills but he breaks these in thirds.  Typically one third of 100 mg sumatriptan will abort his headache.  He denied any ptosis or facial droop associated with his headaches.  He has had an eye exam recently and was told everything was normal  He has occasional dizziness but this is rare.  He denied any confusional episodes, seizures, dysarthria or stroke symptoms.       He has some tingling in his left that has been  ongoing for quite some time, he had a previous EMG that apparently showed carpal tunnel, he does not really wear his wrist splints often.  He denied any syncopal episodes, tremor weakness or falls.     I did order MRI of the brain, this has since been done as well as MRA and both were read as normal per radiology.      He tells me that since starting the low-dose amitriptyline and then adding the Emgality injections his migraines and headaches are significantly improved.  He is not really having any migraines at all and may have just a couple very mild headaches per month, he has not needed Imitrex in quite some time    At last visit I did get an EMG as he was complaining of some tingling in his left arm, he had a history of carpal tunnel.  EMG confirms severe carpal tunnel, he saw Ortho who did not feel like his carpal tunnel was symptomatic.  EMG did not show evidence of radiculopathy however he continues to have some discomfort from his neck shooting down to his hand.  Denies weakness of his upper extremities, denies problems with balance or falls    He tells me he is actually feeling very well  The following portions of the patient's history were reviewed and updated as appropriate: allergies, current medications, past family history, past medical history, past social history, past surgical history and problem list.    Past Medical History:   Diagnosis Date   • Cluster headache    • Hyperlipidemia    • Hypertension    • Kidney stone        Past Surgical History:   Procedure Laterality Date   • ANKLE SURGERY     • CYSTOSCOPY BLADDER STONE LITHOTRIPSY         Social History     Socioeconomic History   • Marital status: Single   Tobacco Use   • Smoking status: Light Tobacco Smoker     Types: Cigars   • Smokeless tobacco: Never Used   Vaping Use   • Vaping Use: Never used   Substance and Sexual Activity   • Alcohol use: No   • Drug use: No   • Sexual activity: Yes       Family History   Problem Relation Age of Onset  "  • Cancer Mother    • Dementia Father    • Hypertension Father    • Hyperlipidemia Father    • Heart failure Father         Review of Systems   Constitutional: Negative.    Eyes: Negative.    Respiratory: Negative.    Cardiovascular: Negative.    Gastrointestinal: Negative.    Endocrine: Negative.    Genitourinary: Negative.    Musculoskeletal: Negative.    Skin: Negative.    Allergic/Immunologic: Negative.    Neurological: Positive for numbness (left hand) and headaches. Negative for dizziness, tremors, seizures, syncope, facial asymmetry, speech difficulty, weakness and light-headedness.   Hematological: Negative.    Psychiatric/Behavioral: Negative.         Objective:  /88   Pulse 90   Temp 95.3 °F (35.2 °C)   Ht 165.1 cm (65\")   Wt 92.1 kg (203 lb)   SpO2 98%   BMI 33.78 kg/m²     Neurologic Exam     Mental Status   Oriented to person, place, and time.   Attention: normal. Concentration: normal.   Speech: speech is normal   Level of consciousness: alert  Knowledge: consistent with education.   Able to read. Able to write. Normal comprehension.     Cranial Nerves   Cranial nerves II through XII intact.     CN II   Visual fields full to confrontation.   Right visual field deficit: none  Left visual field deficit: none     CN III, IV, VI   Pupils are equal, round, and reactive to light.  Extraocular motions are normal.   Right pupil: Shape: regular. Reactivity: brisk. Consensual response: intact. Accommodation: intact.   Left pupil: Shape: regular. Reactivity: brisk. Consensual response: intact. Accommodation: intact.   CN III: no CN III palsy  CN VI: no CN VI palsy  Nystagmus: none   Upgaze: normal  Downgaze: normal    CN V   Facial sensation intact.     CN VII   Facial expression full, symmetric.     CN VIII   CN VIII normal.     CN IX, X   CN IX normal.   CN X normal.     CN XI   CN XI normal.     CN XII   CN XII normal.     Motor Exam   Muscle bulk: normal  Overall muscle tone: normal  Right arm " tone: normal  Left arm tone: normal  Right arm pronator drift: absent  Left arm pronator drift: absent  Right leg tone: normal  Left leg tone: normal    Strength   Strength 5/5 throughout.     Sensory Exam   Light touch normal.     Gait, Coordination, and Reflexes     Gait  Gait: normal    Coordination   Finger to nose coordination: normal    Tremor   Resting tremor: absent  Intention tremor: absent  Action tremor: absent    Reflexes   Reflexes 2+ except as noted.       Physical Exam  Vitals reviewed.   Constitutional:       General: He is not in acute distress.     Appearance: He is well-developed. He is obese. He is not ill-appearing or toxic-appearing.   HENT:      Head: Normocephalic and atraumatic.      Mouth/Throat:      Mouth: Mucous membranes are moist.   Eyes:      General: No scleral icterus.     Extraocular Movements: Extraocular movements intact and EOM normal.      Conjunctiva/sclera: Conjunctivae normal.      Pupils: Pupils are equal, round, and reactive to light.   Pulmonary:      Effort: Pulmonary effort is normal. No respiratory distress.   Musculoskeletal:      Cervical back: Normal range of motion and neck supple.   Skin:     General: Skin is warm.      Capillary Refill: Capillary refill takes less than 2 seconds.   Neurological:      General: No focal deficit present.      Mental Status: He is alert and oriented to person, place, and time.      Coordination: Finger-Nose-Finger Test normal.      Gait: Gait is intact.      Deep Tendon Reflexes: Strength normal.   Psychiatric:         Mood and Affect: Mood normal.         Speech: Speech normal.         Behavior: Behavior normal.         Thought Content: Thought content normal.         Judgment: Judgment normal.         Assessment/Plan:       Diagnoses and all orders for this visit:    1. Chronic migraine without aura without status migrainosus, not intractable (Primary)  Comments:  Continue low-dose amitriptyline and Emgality injections    2.  Paresthesia of left upper limb  Comments:  EMG shows carpal tunnel, Ortho did not feel like this was symptomatic, check MRI of the neck  Orders:  -     MRI Cervical Spine Without Contrast    3. Neck pain    Mr. Curiel reports he is doing great with the low-dose amitriptyline and Emgality injections.  He has not had a full migraine in quite some time, has occasional mild headaches.  Ortho did not feel like his carpal tunnel was symptomatic, he continues to complain of some tingling in his left arm, adamantly denies any chest pain or discomfort.  Has some neck discomfort as well.  Will check MRI of the C-spine although EMG did not show evidence of radiculopathy  Exam is normal today  We will see him back here in the clinic in 6 months but I encouraged him to reach out with any questions concerns or problems prior to follow-up appointment           Reviewed medications, potential side effects and signs and symptoms to report. Discussed risk versus benefits of treatment plan with patient and/or family-including medications, labs and radiology that may be ordered. Addressed questions and concerns during visit. Patient and/or family verbalized understanding and agree with plan.    AS THE PROVIDER, I PERSONALLY WORE PPE DURING ENTIRE FACE TO FACE ENCOUNTER IN CLINIC WITH THE PATIENT. PATIENT ALSO WORE PPE DURING ENTIRE FACE TO FACE ENCOUNTER EXCEPT FOR A MAX OF 30 SECONDS DURING NEUROLOGICAL EVALUATION OF CRANIAL NERVES AND THEN MASK WAS PLACED BACK OVER PATIENT FACE FOR REMAINDER OF VISIT. I WASHED MY HANDS BEFORE AND AFTER VISIT.    During this visit the following were done:  Labs Reviewed []    Labs Ordered []    Radiology Reports Reviewed []    Radiology Ordered []    PCP Records Reviewed []    Referring Provider Records Reviewed []    ER Records Reviewed []    Hospital Records Reviewed []    History Obtained From Family []    Radiology Images Reviewed []    Other Reviewed []    Records Requested []      Edwina MARI  Britany, APRN  3/9/2022

## 2022-04-12 ENCOUNTER — HOSPITAL ENCOUNTER (OUTPATIENT)
Dept: MRI IMAGING | Facility: HOSPITAL | Age: 57
Discharge: HOME | End: 2022-04-12
Admitting: NURSE PRACTITIONER

## 2022-04-12 PROCEDURE — 72141 MRI NECK SPINE W/O DYE: CPT

## 2022-04-13 ENCOUNTER — TELEPHONE (OUTPATIENT)
Dept: NEUROLOGY | Facility: CLINIC | Age: 57
End: 2022-04-13

## 2022-04-13 DIAGNOSIS — M50.30 DDD (DEGENERATIVE DISC DISEASE), CERVICAL: Primary | ICD-10-CM

## 2022-04-13 DIAGNOSIS — N88.9 CERVICAL LESION: ICD-10-CM

## 2022-04-13 DIAGNOSIS — R20.2 ARM PARESTHESIA, LEFT: ICD-10-CM

## 2022-04-15 ENCOUNTER — TELEPHONE (OUTPATIENT)
Dept: NEUROLOGY | Facility: CLINIC | Age: 57
End: 2022-04-15

## 2022-05-02 ENCOUNTER — OFFICE VISIT (OUTPATIENT)
Dept: NEUROSURGERY | Facility: CLINIC | Age: 57
End: 2022-05-02

## 2022-05-02 VITALS
BODY MASS INDEX: 33.59 KG/M2 | SYSTOLIC BLOOD PRESSURE: 168 MMHG | DIASTOLIC BLOOD PRESSURE: 96 MMHG | WEIGHT: 201.6 LBS | RESPIRATION RATE: 18 BRPM | HEART RATE: 82 BPM | HEIGHT: 65 IN

## 2022-05-02 DIAGNOSIS — G89.29 CHRONIC NECK PAIN: Primary | ICD-10-CM

## 2022-05-02 DIAGNOSIS — E66.09 CLASS 1 OBESITY DUE TO EXCESS CALORIES WITH SERIOUS COMORBIDITY AND BODY MASS INDEX (BMI) OF 33.0 TO 33.9 IN ADULT: ICD-10-CM

## 2022-05-02 DIAGNOSIS — M54.2 CHRONIC NECK PAIN: Primary | ICD-10-CM

## 2022-05-02 DIAGNOSIS — E78.5 DYSLIPIDEMIA: ICD-10-CM

## 2022-05-02 DIAGNOSIS — I10 ESSENTIAL HYPERTENSION: ICD-10-CM

## 2022-05-02 PROCEDURE — 99204 OFFICE O/P NEW MOD 45 MIN: CPT | Performed by: NEUROLOGICAL SURGERY

## 2022-05-02 RX ORDER — CYCLOBENZAPRINE HCL 10 MG
5 TABLET ORAL
COMMUNITY
Start: 2022-03-10 | End: 2022-12-01

## 2022-05-02 RX ORDER — SUMATRIPTAN 100 MG/1
TABLET, FILM COATED ORAL
COMMUNITY
Start: 2022-04-09 | End: 2022-12-01 | Stop reason: SDUPTHER

## 2022-05-02 NOTE — PROGRESS NOTES
Subjective     Chief Complaint: Neck pain    Patient ID: Miky Curiel is a 56 y.o. male seen for consultation today at the request of  RADHA Jimenez*    History of Present Illness    This is a 56-year-old man who presents to my office with chief complaints of headache and left-sided neck pain.  He denies radiating pain or weakness into his shoulders, arms, or hands.  He has chronic hand numbness due to carpal tunnel syndrome.  He has not tried physical therapy.  He maintains a sedentary lifestyle and does not exercise regularly.  He is currently not working although he is looking for work.  He endorses cigar use.  He endorses occasional alcohol use.    The following portions of the patient's history were reviewed and updated as appropriate: allergies, current medications, past family history, past medical history, past social history, past surgical history and problem list.    Family history:   Family History   Problem Relation Age of Onset   • Cancer Mother    • Dementia Father    • Hypertension Father    • Hyperlipidemia Father    • Heart failure Father        Social history:   Social History     Socioeconomic History   • Marital status: Single   Tobacco Use   • Smoking status: Light Tobacco Smoker     Types: Cigars   • Smokeless tobacco: Never Used   Vaping Use   • Vaping Use: Never used   Substance and Sexual Activity   • Alcohol use: No   • Drug use: No   • Sexual activity: Yes       Review of Systems   Constitutional: Negative for activity change, appetite change, chills, diaphoresis, fatigue, fever and unexpected weight change.   HENT: Negative for congestion, dental problem, drooling, ear discharge, ear pain, facial swelling, hearing loss, mouth sores, nosebleeds, postnasal drip, rhinorrhea, sinus pressure, sinus pain, sneezing, sore throat, tinnitus, trouble swallowing and voice change.    Eyes: Negative for photophobia, pain, discharge, redness, itching and visual disturbance.  "  Respiratory: Negative for apnea, cough, choking, chest tightness, shortness of breath, wheezing and stridor.    Cardiovascular: Negative for chest pain, palpitations and leg swelling.   Gastrointestinal: Negative for abdominal distention, abdominal pain, anal bleeding, blood in stool, constipation, diarrhea, nausea, rectal pain and vomiting.   Endocrine: Negative for cold intolerance, heat intolerance, polydipsia, polyphagia and polyuria.   Genitourinary: Negative for decreased urine volume, difficulty urinating, dysuria, enuresis, flank pain, frequency, genital sores, hematuria and urgency.   Musculoskeletal: Positive for neck pain. Negative for arthralgias, back pain, gait problem, joint swelling, myalgias and neck stiffness.   Skin: Negative for color change, pallor, rash and wound.   Allergic/Immunologic: Negative for environmental allergies, food allergies and immunocompromised state.   Neurological: Positive for weakness and numbness. Negative for dizziness, tremors, seizures, syncope, facial asymmetry, speech difficulty, light-headedness and headaches.   Hematological: Negative for adenopathy. Does not bruise/bleed easily.   Psychiatric/Behavioral: Negative for agitation, behavioral problems, confusion, decreased concentration, dysphoric mood, hallucinations, self-injury, sleep disturbance and suicidal ideas. The patient is not nervous/anxious and is not hyperactive.        Objective   Blood pressure 168/96, pulse 82, resp. rate 18, height 165.1 cm (65\"), weight 91.4 kg (201 lb 9.6 oz).  Body mass index is 33.55 kg/m².    Physical Exam  Vitals and nursing note reviewed.   Constitutional:       Appearance: He is well-developed. He is not toxic-appearing.   HENT:      Head: Normocephalic and atraumatic.      Right Ear: Hearing normal.      Left Ear: Hearing normal.      Nose: Nose normal.   Eyes:      General: Lids are normal.      Conjunctiva/sclera: Conjunctivae normal.      Pupils: Pupils are equal, round, " and reactive to light.   Neck:      Vascular: No JVD.      Comments: Restricted range of motion particularly in extension and lateral bending.  Cardiovascular:      Rate and Rhythm: Normal rate and regular rhythm.      Pulses:           Radial pulses are 2+ on the right side and 2+ on the left side.   Pulmonary:      Effort: Pulmonary effort is normal. No respiratory distress.      Breath sounds: No stridor. No wheezing.   Musculoskeletal:      Cervical back: Pain with movement present. No spinous process tenderness. Decreased range of motion.   Skin:     General: Skin is warm and dry.      Findings: No erythema or rash.   Neurological:      Mental Status: He is alert and oriented to person, place, and time.      GCS: GCS eye subscore is 4. GCS verbal subscore is 5. GCS motor subscore is 6.      Cranial Nerves: No cranial nerve deficit.      Motor: No abnormal muscle tone.      Deep Tendon Reflexes: Reflexes normal.      Reflex Scores:       Tricep reflexes are 1+ on the right side and 1+ on the left side.       Bicep reflexes are 1+ on the right side and 1+ on the left side.       Brachioradialis reflexes are 1+ on the right side and 1+ on the left side.     Comments: Casual, toe raised, heel raised, and tandem gait are all performed unremarkably.  Station is intact.  Muscle stretch reflexes are symmetric and hyporeactive.  Lamar sign is absent.  Lhermitte sign is absent.   Psychiatric:         Behavior: Behavior normal.         Thought Content: Thought content normal.         Judgment: Judgment normal.         Assessment/Plan     Independent Review of Radiographic Studies:      Available for my review is a MRI of the cervical spine which was performed on April 12, 2022.  The central canal is congenitally narrowed, and there is a broad-based disc bulge at C3-4 which causes moderate, bordering on severe central canal stenosis.  I do not appreciate any obvious prolonged signal within the spinal cord parenchyma to  suggest compressive myelopathy.  Cervical lordosis has been eliminated.    Medical Decision Making:      This is a 56-year-old man with chronic, axial neck pain.  There is no role for surgical intervention in the management of his neck pain.  My recommendation is for physical therapy and he may benefit from some pain management as well.  I carefully reviewed the signs and symptoms of cervical radiculopathy and cervical myelopathy with him.  I directed him to contact my office with any new or worsening neurological symptoms.  I be happy to follow-up with him on an as-needed basis moving forward.    Diagnoses and all orders for this visit:    1. Chronic neck pain (Primary)  -     Ambulatory Referral to Physical Therapy Evaluate and treat; Soft Tissue Mobilizaton; ROM, Stretching, Strengthening  -     Ambulatory Referral to Pain Management    2. Dyslipidemia    3. Essential hypertension    4. Class 1 obesity due to excess calories with serious comorbidity and body mass index (BMI) of 33.0 to 33.9 in adult        No follow-ups on file.           This document signed by KIA Musa MD May 2, 2022 13:29 EDT

## 2022-05-04 RX ORDER — AMITRIPTYLINE HYDROCHLORIDE 10 MG/1
TABLET, FILM COATED ORAL
Qty: 30 TABLET | Refills: 3 | Status: SHIPPED | OUTPATIENT
Start: 2022-05-04 | End: 2022-12-01 | Stop reason: SDUPTHER

## 2022-05-04 NOTE — TELEPHONE ENCOUNTER
Rx Refill Note  Requested Prescriptions     Pending Prescriptions Disp Refills   • amitriptyline (ELAVIL) 10 MG tablet [Pharmacy Med Name: AMITRIPTYLINE HCL 10 MG TAB] 30 tablet 3     Sig: TAKE 1 TABLET BY MOUTH EVERY DAY AT NIGHT      Last office visit with prescribing clinician: 3/9/2022      Next office visit with prescribing clinician: 9/9/2022            Mickie Esteban MA  05/04/22, 08:25 EDT

## 2022-06-01 PROBLEM — M50.30 DDD (DEGENERATIVE DISC DISEASE), CERVICAL: Status: ACTIVE | Noted: 2022-06-01

## 2022-06-01 PROBLEM — M99.51 INTERVERTEBRAL DISC STENOSIS OF NEURAL CANAL OF CERVICAL REGION: Status: ACTIVE | Noted: 2022-06-01

## 2022-06-01 PROBLEM — M47.812 CERVICAL SPONDYLOSIS WITHOUT MYELOPATHY: Status: ACTIVE | Noted: 2022-06-01

## 2022-06-01 PROBLEM — M43.12 SPONDYLOLISTHESIS OF CERVICAL REGION: Status: ACTIVE | Noted: 2022-06-01

## 2022-06-01 NOTE — PROGRESS NOTES
"Chief Complaint: \"Neck pain.\"        History of Present Illness:   Patient: Mr. Miky Curiel, 56 y.o. male   Referring Physician: Dr. Pablo Musa  Reason for Referral: Consultation for chronic intractable posterior coker pain.   Pain History: Patient reports a several year history of progressive posterior neck pain, which began without incident.  Patient complains of headaches and left-sided neck pain. He denies radicular symptoms.  He reports a history of chronic numbness in his hands secondary to severe carpal tunnel syndrome as also has been documented by a recent EMG/NCV.  He says DELVIN Pagan for treatment of his chronic headaches. He presents with significant comorbidities including hyperlipidemia, hypertension, obesity, light tobacco smoker, and sedentary behavior.  MRI of the cervical spine on April 12, 2022 revealed a congenitally narrowed central canal with multilevel spondylosis with disc osteophyte complexes and facet hypertrophy..  There is a broad-based disc bulge at C3-C4 causing moderate to severe central canal stenosis.  There is no evidence of compressive myelopathy.  There is a signal at the level of the C5 vertebral body of a prominent central canal or small syrinx formation. Patient has failed to obtain pain relief with conservative measures for more than 3 months including oral analgesics, physical therapy, massage therapy, to name a few. Miky Curiel underwent neurosurgical consultation with Dr. Pablo Musa on 05/01/2022, and was found not to be a surgical candidate in the absence of cervical myelopathy. Pain has progressed in intensity over the past months.   Pain Description: Constant left-sided posterior neck and left shoulder pain with intermittent exacerbation, described as aching, burning, dull, sharp and throbbing sensation.   Radiation of Pain: The pain radiates into the left shoulder  Pain intensity today: 8/10  Average pain intensity " last week: 8/10  Pain intensity ranges from: 5/10 to 10/10  Aggravating factors: Pain increases with extension, rotation of the cervical spine.   Alleviating factors: Pain decreases with heat, lying down  Associated Symptoms:   Patient denies pain, numbness, or weakness in the upper extremities except for CTS.   Patient denies any new bladder or bowel problems.   Patient denies difficulties with his balance or recent falls.   Pain interferes with ADLs, general activities, and affects patient's quality of life  Pain interferes with sleep causing sleep fragmentation   Stiffness    Review of previous therapies and additional medical records:  Miky Curiel has already failed the following measures, including:   Conservative Measures: Oral analgesics, topical analgesics, heat, massage, physical therapy   Interventional Measures: None in his neck or shoulder. Had injections for CT and in his lower back at an outside facility  Surgical Measures: No history of previous cervical spine or shoulder surgery. No history of previous lumbar spine or hip surgery   Miky Curiel underwent neurosurgical consultation with Dr. Pablo Musa on 05/01/2022, and was found not to be a surgical candidate.  Miky Curiel presents with significant comorbidities including hyperlipidemia, hypertension, obesity, light tobacco smoker  In terms of current analgesics, Miky Curiel takes: Amitriptyline, cyclobenzaprine, diclofenac XR, Emgality, Lidoderm, Imitrex  I have reviewed Chuck Report #[id number] consistent with medication reconciliation.  SOAPP: Not completed by the patient    PHQ-2 Depression Screening  Little interest or pleasure in doing things? 0-->not at all   Feeling down, depressed, or hopeless? 0-->not at all   PHQ-2 Total Score 0       Global Pain Scale 06-07  2022          Pain 19          Feelings 0          Clinical outcomes 12          Activities 19          GPS Total: 50            Review of  Diagnostic Studies:  I have reviewed the images with the patient and used the images and a tridimensional spine model to explain findings. I have reviewed the report, as well.  MRI of the cervical spine without contrast 4/12/2022.  Vertebral body heights and alignment are maintained.  There is a straightening of the cervical lordosis.  There is a 1 to 2 mm area of central hyperintensity at the level of the C5 vertebral body favoring prominent central canal/small syrinx.  Otherwise, spinal cord signal is normal.  Multilevel spondylosis.  Axial imaging:  C2-C3: Disc osteophyte complex, facet arthropathy.  No significant canal or foraminal stenosis  C3-C4: Disc osteophyte complex, facet arthropathy.  There is mild listhesis of C3 on C4.  Severe spinal canal stenosis and moderate to severe bilateral neuroforaminal stenosis, greater on the left  C4-C5: Disc osteophyte complex, facet arthropathy.  Moderate to severe central canal stenosis and moderate bilateral neuroforaminal stenosis  C5-C6: Disc osteophyte complex, facet arthropathy.  Moderate central canal stenosis and bilateral neuroforaminal stenosis   C6-C7: Disc osteophyte complex, facet arthropathy.  Mild to moderate central canal stenosis and bilateral neuroforaminal stenosis, greater on the right  MRI angiogram of the head without contrast and MRI of the brain without contrast on 6/2/2021 revealed a normal MRI of the brain.  Normal MRI angiogram  EMG/NCV on 11/9/2021: No electrophysiologic evidence for radiculopathy or plexopathy in the left upper extremity.  Severe bilateral median neuropathy at the wrist (carpal tunnel).    Review of Systems   Musculoskeletal: Positive for back pain, neck pain and neck stiffness.   All other systems reviewed and are negative.        Patient Active Problem List   Diagnosis   • Chronic migraine without aura without status migrainosus, not intractable   • Class 1 obesity due to excess calories with serious comorbidity and body  mass index (BMI) of 33.0 to 33.9 in adult   • Dyslipidemia   • Essential hypertension   • Cervical spondylosis without myelopathy   • DDD (degenerative disc disease), cervical   • Intervertebral disc stenosis of neural canal of cervical region   • Spondylolisthesis of cervical region   • Current every day smoker   • Encounter for smoking cessation counseling       Past Medical History:   Diagnosis Date   • Cluster headache    • Hyperlipidemia    • Hypertension    • Kidney stone          Past Surgical History:   Procedure Laterality Date   • ANKLE SURGERY     • CYSTOSCOPY BLADDER STONE LITHOTRIPSY           Family History   Problem Relation Age of Onset   • Cancer Mother    • Dementia Father    • Hypertension Father    • Hyperlipidemia Father    • Heart failure Father          Social History     Socioeconomic History   • Marital status: Single   Tobacco Use   • Smoking status: Light Tobacco Smoker     Types: Cigars   • Smokeless tobacco: Never Used   Vaping Use   • Vaping Use: Never used   Substance and Sexual Activity   • Alcohol use: No   • Drug use: No   • Sexual activity: Yes          Current Outpatient Medications:   •  amitriptyline (ELAVIL) 10 MG tablet, TAKE 1 TABLET BY MOUTH EVERY DAY AT NIGHT, Disp: 30 tablet, Rfl: 3  •  atorvastatin (LIPITOR) 20 MG tablet, Take 20 mg by mouth Every Night., Disp: , Rfl:   •  chlorthalidone (HYGROTON) 25 MG tablet, Take 25 mg by mouth Daily., Disp: , Rfl:   •  cyclobenzaprine (FLEXERIL) 10 MG tablet, Take 1 tablet by mouth 3 (Three) Times a Day As Needed for Muscle Spasms., Disp: 15 tablet, Rfl: 0  •  cyclobenzaprine (FLEXERIL) 10 MG tablet, Take 5 mg by mouth., Disp: , Rfl:   •  diclofenac sodium (VOTAREN XR) 100 MG 24 hr tablet, TAKE ONE TABLET BY MOUTH DAILY AS NEEDED FOR SEVERE PAIN, Disp: , Rfl:   •  fluticasone (FLONASE) 50 MCG/ACT nasal spray, 2 sprays by Each Nare route Daily., Disp: , Rfl:   •  galcanezumab-gnlm (EMGALITY) 120 MG/ML auto-injector pen, Inject 1 mL  "under the skin into the appropriate area as directed Every 30 (Thirty) Days., Disp: 1.12 mL, Rfl: 11  •  lidocaine (LIDODERM) 5 %, APPLY 1 PATCH AND LEAVE IN PLACE FOR 12 HOURS, THEN REMOVE AND LEAVE OFF FOR 12 HOURS, Disp: , Rfl:   •  lisinopril (PRINIVIL,ZESTRIL) 20 MG tablet, Take 20 mg by mouth Daily., Disp: , Rfl:   •  SUMAtriptan (IMITREX) 100 MG tablet, TAKE 1 TABLET BY MOUTH FOR MIGRAINE RELIEF. MAY REPEAT 2 HOURS LATER.  MG/DAY., Disp: , Rfl:   •  SUMAtriptan (IMITREX) 25 MG tablet, Take 25 mg by mouth 1 (One) Time As Needed., Disp: , Rfl:   •  verapamil ER (VERELAN) 240 MG 24 hr capsule, Take 240 mg by mouth Daily., Disp: , Rfl:   •  loratadine (CLARITIN) 10 MG tablet, Take 10 mg by mouth Daily., Disp: , Rfl:       No Known Allergies      /93   Pulse 77   Temp 96.9 °F (36.1 °C)   Ht 165.1 cm (65\")   Wt 91.4 kg (201 lb 9.6 oz)   SpO2 98%   BMI 33.55 kg/m²       Physical Exam:  Constitutional: Patient appears well-developed, well-nourished, well-hydrated, appears younger than stated age  HEENT: Head: Normocephalic and atraumatic  Eyes: Conjunctivae and lids are normal  Pupils: Equal, round, reactive to light  Neck: Trachea normal. Neck supple. No JVD present.   Lymphatic: No cervical adenopathy  Musculoskeletal   Gait and station: Gait evaluation demonstrated a normal gait. Able to walk on heels, toes, tandem walking   Cervical spine: Passive and active range of motion are limited secondary to pain. Extension, lateral flexion, rotation of the cervical spine increased and reproduced pain. Cervical facet joint loading maneuvers are positive on the left side.  Muscles: Presence of active trigger points levator scapulae   Shoulders: The range of motion of the glenohumeral joints is full and without pain. Rotator cuff strength is 5/5.   Neurological:   Patient is alert and oriented to person, place, and time.   Speech: Normal.   Cortical function: Normal mental status.   Reflex Scores:  Right " brachioradialis: 2+  Left brachioradialis: 2+  Right biceps: 2+  Left biceps: 2+  Right triceps: 2+  Left triceps: 2+  Right patellar: 1+  Left patellar: 1+  Right Achilles: 1+  Left Achilles: 1+  Motor strength: 5/5  Motor Tone: Normal  Involuntary movements: None.   Superficial/Primitive Reflexes: Primitive reflexes were absent.   Right Chang: Absent  Left Chang: Absent  Right ankle clonus: Absent  Left ankle clonus: Absent   Babinsky: Absent  Spurling sign: Negative. Neck tornado test: Negative. Lhermitte sign: Negative. Long tract signs: Negative.   Sensory exam: Intact to light touch, intact pain and temperature sensation, intact vibration sensation and normal proprioception.   Coordination: Normal finger to nose and heel to shin. Normal balance and negative Romberg's sign   Skin and subcutaneous tissue: Skin is warm and intact. No rash noted. No cyanosis.   Psychiatric: Judgment and insight: Normal. Recent and remote memory: Intact. Mood and affect: Normal.     ASSESSMENT:   1. Cervical spondylosis without myelopathy    2. DDD (degenerative disc disease), cervical    3. Intervertebral disc stenosis of neural canal of cervical region    4. Spondylolisthesis of cervical region    5. Chronic migraine without aura without status migrainosus, not intractable    6. Class 1 obesity due to excess calories with serious comorbidity and body mass index (BMI) of 33.0 to 33.9 in adult    7. Current every day smoker    8. Encounter for smoking cessation counseling          PLAN/MEDICAL DECISION MAKING:  Mr. Miky Curiel, 56 y.o. male  presents with a several year history of progressive posterior left-sided neck pain, which began without incident. Patient complains of chronic migraine headaches and sees DELVIN Alcantara in neurology for treatment of his headaches. He complains of chronic left-sided posterior neck pain. He denies radicular symptoms.  He reports a history of chronic numbness in his hands  secondary to severe carpal tunnel syndrome as also has been documented by a recent EMG/NCV. He presents with significant comorbidities including hyperlipidemia, hypertension, obesity, cigar tobacco smoker, and sedentary behavior.  MRI of the cervical spine on April 12, 2022 revealed a congenitally narrowed central canal with multilevel spondylosis with disc osteophyte complexes and facet hypertrophy. There is a broad-based disc bulge at C3-C4 causing moderate to severe central canal stenosis. There is no evidence of compressive myelopathy. Patient has failed to obtain pain relief with conservative measures for more than 3 months including oral analgesics, physical therapy, massage therapy, to name a few. Miky Curiel underwent neurosurgical consultation with Dr. Pablo Musa on 05/01/2022, and was found not to be a surgical candidate in the absence of cervical myelopathy. Pain has progressed in intensity over the past months.  A comprehensive initial evaluation including history and physical exam were performed including pertinent physiologic and functional assessment. Patient presents with intractable pain due to the diagnoses listed above. Patient has failed to respond to conservative modalities, as referenced under HPI including the impact of patient's moderate-to-severe pain contributing to significant impairment in daily activities, ADLs, and a negative impact on quality of life. Supporting diagnostic studies of patient's chronic pain condition have been reviewed. I have reviewed all available patient's medical records as well as previous therapies as referenced above. I had a lengthy conversation with . Miky Curiel regarding his chronic pain condition and potential therapeutic options including risks, benefits, alternative therapies, to name a few. We have discussed using a stepwise approach starting with the shortest or least intense level of treatment, care, or service as determined  by the extent required to diagnose and or treat patient's condition. The treatments proposed are consistent with the patient's medical condition and are known to be as safe and effective by current guidelines and standard of care. There is no evidence of absolute contraindications for the proposed procedures under the current circumstances. These treatments are not considered experimental or investigational. The duration and frequency proposed are considered appropriate for the service in accordance with accepted standards of medical practice for the diagnosis and or treatment of the patient's condition and or intended to improve the patient's level of function. These services will be furnished in a setting appropriate to the patient's medical needs and condition. Therefore, I have proposed the following plan:  1. Interventional pain management measures: Patient will be scheduled for diagnostic left cervical medial branch blocks at C3, C4, C5, C6; for left cervical facet joints at C3-C4, C4-C5, C5-C6, to clarify the origin of chronic refractory mechanical/axial cervicalgia. If patient experiences more than 80% pain relief along with significant improvement in the range of motion of the cervical spine, then, patient will be scheduled for a second set of diagnostic left cervical medial branch blocks, to then, proceed with left cervical medial branch rhizotomies.  I will.  Otherwise we may consider a cervical epidural steroid injection by interlaminar approach versus diagnostic and therapeutic left C3-C4 transforaminal approach if patient could be a candidate for surgical consideration.  2. Diagnostic studies:   A. X-ray and extension x-rays of the cervical spine to assess cervical stability  B. We may consider repeating a cervical MRI with and without contrast according to response to treatment and/or progression of symptoms  3. Pharmacological measures: Reviewed and discussed; Patient takes amitriptyline,  cyclobenzaprine, diclofenac XR, Emgality, Lidoderm, Imitrex. Patient has declined additional pharmacological measures at this time  4. Long-term rehabilitation efforts:  A. The patient does not have a history of falls. I did complete a risk assessment for falls.   B. Patient will start a comprehensive physical therapy program for upper body strengthening/posture correction, gait and balance training, neurodynamics, ASTYM, E-STIM, myofascial release, cupping, dry needling, home exercises, once pain is under control  C. Start an exercise program such as daily walks for fitness  D. Contrast therapy: Apply ice-packs for 15-20 minutes, followed by heating pads for 15-20 minutes to affected area   E. Referral to [facility]. Patient's Body mass index is 33.55 kg/m². Patient counseled on the importance of weight loss to help with overall health and pain control.   F. Miky Curiel  reports that he has been smoking cigars. He has never used smokeless tobacco.  I have educated him on the risk of diseases from using tobacco products such as cancer, COPD and heart disease. I advised him to quit and he is willing to quit. We have discussed the following method/s for tobacco cessation:  Nicotine replacement therapy.  Start nicotine patches 21 mg one patch daily for 4 weeks, then Nicotine patches 14 mg one patch daily for 2 weeks, then Nicotine patches 7 mg one patch daily for 2 weeks, then, discontinue. Patient has been instructed to cut down or stop as he starts nicotine replacement therapy. I spent 4 minutes counseling the patient.  5. The patient has been instructed to contact my office with any questions or difficulties. The patient understands the plan and agrees to proceed accordingly.    The patient has a documented plan of care to address chronic pain. Miky Curiel reports a pain score of 8/10.  Given his pain assessment as noted, treatment options were discussed and the  following options were decided upon as a follow-up plan to address the patient's pain: continuation of current treatment plan for pain, educational materials on pain management, home exercises and therapy, prescription for non-opiod analgesics, referral to Physical Therapy, referral to specialist for assistance in pain treatment guidance, steroid injections, use of non-medical modalities (ice, heat, stretching and/or behavior modifications) and interventional pain management measures.           Pain Management Panel    There is no flowsheet data to display.        JOVAN query complete. JOVAN reviewed by Ray Florez MD.     Pain Medications             amitriptyline (ELAVIL) 10 MG tablet TAKE 1 TABLET BY MOUTH EVERY DAY AT NIGHT    cyclobenzaprine (FLEXERIL) 10 MG tablet Take 1 tablet by mouth 3 (Three) Times a Day As Needed for Muscle Spasms.    cyclobenzaprine (FLEXERIL) 10 MG tablet Take 5 mg by mouth.    diclofenac sodium (VOTAREN XR) 100 MG 24 hr tablet TAKE ONE TABLET BY MOUTH DAILY AS NEEDED FOR SEVERE PAIN         Ray Florez MD    Please note that portions of this note were completed with a voice recognition program.

## 2022-06-07 ENCOUNTER — OFFICE VISIT (OUTPATIENT)
Dept: PAIN MEDICINE | Facility: CLINIC | Age: 57
End: 2022-06-07

## 2022-06-07 VITALS
BODY MASS INDEX: 33.59 KG/M2 | OXYGEN SATURATION: 98 % | WEIGHT: 201.6 LBS | HEIGHT: 65 IN | DIASTOLIC BLOOD PRESSURE: 93 MMHG | TEMPERATURE: 96.9 F | HEART RATE: 77 BPM | SYSTOLIC BLOOD PRESSURE: 156 MMHG

## 2022-06-07 DIAGNOSIS — M50.30 DDD (DEGENERATIVE DISC DISEASE), CERVICAL: ICD-10-CM

## 2022-06-07 DIAGNOSIS — M43.12 SPONDYLOLISTHESIS OF CERVICAL REGION: ICD-10-CM

## 2022-06-07 DIAGNOSIS — M99.51 INTERVERTEBRAL DISC STENOSIS OF NEURAL CANAL OF CERVICAL REGION: ICD-10-CM

## 2022-06-07 DIAGNOSIS — M47.812 CERVICAL SPONDYLOSIS WITHOUT MYELOPATHY: Primary | ICD-10-CM

## 2022-06-07 DIAGNOSIS — E66.09 CLASS 1 OBESITY DUE TO EXCESS CALORIES WITH SERIOUS COMORBIDITY AND BODY MASS INDEX (BMI) OF 33.0 TO 33.9 IN ADULT: ICD-10-CM

## 2022-06-07 DIAGNOSIS — Z71.6 ENCOUNTER FOR SMOKING CESSATION COUNSELING: ICD-10-CM

## 2022-06-07 DIAGNOSIS — M47.812 CERVICAL SPONDYLOSIS WITHOUT MYELOPATHY: ICD-10-CM

## 2022-06-07 DIAGNOSIS — G43.709 CHRONIC MIGRAINE WITHOUT AURA WITHOUT STATUS MIGRAINOSUS, NOT INTRACTABLE: ICD-10-CM

## 2022-06-07 DIAGNOSIS — F17.200 CURRENT EVERY DAY SMOKER: ICD-10-CM

## 2022-06-07 PROCEDURE — 99204 OFFICE O/P NEW MOD 45 MIN: CPT | Performed by: ANESTHESIOLOGY

## 2022-06-07 PROCEDURE — 99406 BEHAV CHNG SMOKING 3-10 MIN: CPT | Performed by: ANESTHESIOLOGY

## 2022-06-07 RX ORDER — NICOTINE 21 MG/24HR
1 PATCH, TRANSDERMAL 24 HOURS TRANSDERMAL EVERY 24 HOURS
Qty: 14 PATCH | Refills: 0 | Status: SHIPPED | OUTPATIENT
Start: 2022-06-07 | End: 2022-12-01

## 2022-06-07 RX ORDER — NICOTINE 21 MG/24HR
1 PATCH, TRANSDERMAL 24 HOURS TRANSDERMAL EVERY 24 HOURS
Qty: 28 PATCH | Refills: 0 | Status: SHIPPED | OUTPATIENT
Start: 2022-06-07 | End: 2022-12-01

## 2022-06-29 ENCOUNTER — OUTSIDE FACILITY SERVICE (OUTPATIENT)
Dept: PAIN MEDICINE | Facility: CLINIC | Age: 57
End: 2022-06-29

## 2022-11-03 DIAGNOSIS — G43.719 INTRACTABLE CHRONIC MIGRAINE WITHOUT AURA AND WITHOUT STATUS MIGRAINOSUS: ICD-10-CM

## 2022-12-01 ENCOUNTER — OFFICE VISIT (OUTPATIENT)
Dept: NEUROLOGY | Facility: CLINIC | Age: 57
End: 2022-12-01

## 2022-12-01 VITALS
HEART RATE: 90 BPM | WEIGHT: 158 LBS | HEIGHT: 65 IN | SYSTOLIC BLOOD PRESSURE: 140 MMHG | BODY MASS INDEX: 26.33 KG/M2 | DIASTOLIC BLOOD PRESSURE: 80 MMHG | OXYGEN SATURATION: 95 %

## 2022-12-01 DIAGNOSIS — M47.812 CERVICAL SPONDYLOSIS WITHOUT MYELOPATHY: ICD-10-CM

## 2022-12-01 DIAGNOSIS — G43.709 CHRONIC MIGRAINE WITHOUT AURA WITHOUT STATUS MIGRAINOSUS, NOT INTRACTABLE: Primary | ICD-10-CM

## 2022-12-01 DIAGNOSIS — G44.019 EPISODIC CLUSTER HEADACHE, NOT INTRACTABLE: ICD-10-CM

## 2022-12-01 PROBLEM — D12.6 SERRATED ADENOMA OF COLON: Status: ACTIVE | Noted: 2017-06-05

## 2022-12-01 PROBLEM — G56.00 CARPAL TUNNEL SYNDROME: Status: ACTIVE | Noted: 2020-06-24

## 2022-12-01 PROBLEM — M54.41 CHRONIC RIGHT-SIDED LOW BACK PAIN WITH RIGHT-SIDED SCIATICA: Status: ACTIVE | Noted: 2017-04-11

## 2022-12-01 PROBLEM — R20.2 NUMBNESS AND TINGLING OF LEFT UPPER EXTREMITY: Status: ACTIVE | Noted: 2021-12-02

## 2022-12-01 PROBLEM — N52.9 ERECTILE DYSFUNCTION: Status: ACTIVE | Noted: 2017-01-26

## 2022-12-01 PROBLEM — R20.0 NUMBNESS AND TINGLING OF LEFT UPPER EXTREMITY: Status: ACTIVE | Noted: 2021-12-02

## 2022-12-01 PROBLEM — G89.29 CHRONIC RIGHT-SIDED LOW BACK PAIN WITH RIGHT-SIDED SCIATICA: Status: ACTIVE | Noted: 2017-04-11
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
## 2022-12-01 PROBLEM — F32.A DEPRESSION: Status: ACTIVE | Noted: 2020-02-24

## 2022-12-01 PROBLEM — J30.9 ALLERGIC RHINITIS: Status: ACTIVE | Noted: 2019-08-09

## 2022-12-01 PROCEDURE — 99214 OFFICE O/P EST MOD 30 MIN: CPT | Performed by: NURSE PRACTITIONER

## 2022-12-01 RX ORDER — SUMATRIPTAN 100 MG/1
TABLET, FILM COATED ORAL
Qty: 9 TABLET | Refills: 11 | Status: SHIPPED | OUTPATIENT
Start: 2022-12-01

## 2022-12-01 RX ORDER — AMITRIPTYLINE HYDROCHLORIDE 10 MG/1
10 TABLET, FILM COATED ORAL
Qty: 30 TABLET | Refills: 11 | Status: SHIPPED | OUTPATIENT
Start: 2022-12-01

## 2022-12-01 NOTE — PROGRESS NOTES
Subjective:     Patient ID: Miky Curiel is a 57 y.o. male.    CC:   Chief Complaint   Patient presents with   • chronic migraine without aura without status migrainosus, n       HPI:   History of Present Illness   Today 12/1/2022- This is a 57-year-old male who presents for 9-month neurology follow-up. He was previously seen in clinic on 03/09/2022 by DELVIN Alcantara, who is no longer working in our clinic. He has been following for chronic migraine headaches. At last visit in clinic, he was taking low-dose amitriptyline along with Emgality injections for migraine prevention. At that time, he was having some left upper extremity paresthesias. EMG showed carpal tunnel syndrome and he was evaluated by orthopedics, but they did not feel that his carpal tunnel syndrome was symptomatic, so DELVIN Alcantara ordered an MRI of the cervical spine without contrast and this was completed on 04/12/2022 at [facility]. He was referred to Dr. Magdi Musa with neurosurgery and was evaluated on 05/02/2022. He reviewed the MRI of his cervical spine. He referred him to physical therapy as well as pain management. He did review the signs and symptoms of cervical radiculopathy and myelopathy in the case that his symptoms worsened or he needed to follow up with them again. He is currently following with Dr. Florez or at least has been evaluated by him. He has had 1 injection with their clinic on 06/29/2022. He has been referred to physical therapy, but per one note, did not complete that visit on 06/22/2022. He is here for follow-up and refills on his medications today. He does take sumatriptan at onset of his migraines.    He reports that he saw Dr. Musa and then pain management Dr. Florez but denies having the procedure because he did not have a . He reports having a lot of neck pain on the left side. He confirms doing physical therapy at [facility] and it was very helpful. He states that they  "gave him some injections in his back for his past back pain. He denies any physical therapy for his neck. He states that he has not been going to physical therapy for 3 months. He states that he sleeps on a heating pad 3 to 4 nights a week which provides some relief. He will hold-off following pain management but requests referral to physical therapy. He is on flexeril too. Has used nsaids.    He notes he is doing well with his migraines. He states that before he was on amitriptyline his headaches were inconsistent, he would not have a headache for 6 to 9 months and then he could have 2 or 5 headaches in a row. He states that when he gets a migraine, it is mostly on the left eye and temple area. He states that his left eye \"armstrong constantly\" present without any headaches. He states that he was diagnosed with cluster headaches in past as well. He denies nausea. He confirms photophobia, phonophobia, and headache duration of 4 hours or longer. He denies having any triggers. He describes the pain as stabbing and that it \"shuts him down.\" He confirms that the Emgality is working well. He is uncertain if he was having 15 headaches in a month because some days, he could have 3 headaches and then he might not have another 1 for a week or 2 weeks or even a month prior to treatment. He states that he has had only 2 to 3 headaches since he was last seen in March 2022. He affirms he is still taking verapamil which he has been on long term. He reports that he takes sumatriptan 100 mg and he is cutting it in 3 to stretch them to last longer. He states that sumatriptan effectively resolves his migraine sometimes and he can get away with just a 0.333 of the tablet. He states that intermittently he might end up having to take a whole tablet. He affirms that verapamil is prescribed by his primary care physician.     He denies using the NicoDerm patches.    Prior Neurology notes and workup from previous provider:  He told me that he " has had headaches ongoing for a couple years at least.  He had been told in the past that these were migraine and possibly cluster headaches.  He said that typically when he has a headache he will have pain behind either eye that will start as a throbbing sensation and then spread to holoacranial discomfort.  He at times also has associated photo and phonophobia but he never had any nausea or vomiting.   His headaches can be episodic, he may go a month or so without having any headaches, he may have 1 or 2 headaches per month and he may have 3 to 4 in a week.  He had never identified any patterns or triggers to his headaches.  He had not noticed that his headaches are worsened during certain months of the year.  He does at times have watering eyes but he has this bilaterally as he complains of chronic allergies.  He has been on verapamil for quite some time for both blood pressure and headache control. He had also taken propranolol in the past.  He denied any ptosis or facial droop associated with his headaches.  He has had an eye exam and all was fine by report. He has occasional dizziness but this is rare.  He denied any confusional episodes, seizures, dysarthria or stroke symptoms.      I did order MRI of the brain, this has since been done as well as MRA and both were read as normal per radiology-completed 6/2/2021 at [facility].       EMG as he was complaining of some tingling in his left arm, he had a history of carpal tunnel.  EMG confirms severe carpal tunnel, he saw Ortho who did not feel like his carpal tunnel was symptomatic.  EMG did not show evidence of radiculopathy however he continues to have some discomfort from his neck shooting down to his hand.  Denies weakness of his upper extremities, denies problems with balance or falls.    The following portions of the patient's history were reviewed and updated as appropriate: allergies, current medications, past family history, past medical history, past  social history, past surgical history and problem list.    Past Medical History:   Diagnosis Date   • Cluster headache    • Hyperlipidemia    • Hypertension    • Kidney stone        Past Surgical History:   Procedure Laterality Date   • ANKLE SURGERY     • CYSTOSCOPY BLADDER STONE LITHOTRIPSY         Social History     Socioeconomic History   • Marital status: Single   Tobacco Use   • Smoking status: Light Smoker     Types: Cigars   • Smokeless tobacco: Never   Vaping Use   • Vaping Use: Never used   Substance and Sexual Activity   • Alcohol use: No   • Drug use: No   • Sexual activity: Yes       Family History   Problem Relation Age of Onset   • Cancer Mother    • Dementia Father    • Hypertension Father    • Hyperlipidemia Father    • Heart failure Father           Current Outpatient Medications:   •  amitriptyline (ELAVIL) 10 MG tablet, Take 1 tablet by mouth every night at bedtime., Disp: 30 tablet, Rfl: 11  •  atorvastatin (LIPITOR) 20 MG tablet, Take 20 mg by mouth Every Night., Disp: , Rfl:   •  chlorthalidone (HYGROTON) 25 MG tablet, Take 25 mg by mouth Daily., Disp: , Rfl:   •  cyclobenzaprine (FLEXERIL) 10 MG tablet, Take 1 tablet by mouth 3 (Three) Times a Day As Needed for Muscle Spasms., Disp: 15 tablet, Rfl: 0  •  diclofenac sodium (VOTAREN XR) 100 MG 24 hr tablet, TAKE ONE TABLET BY MOUTH DAILY AS NEEDED FOR SEVERE PAIN, Disp: , Rfl:   •  fluticasone (FLONASE) 50 MCG/ACT nasal spray, 2 sprays by Each Nare route Daily., Disp: , Rfl:   •  galcanezumab-gnlm (EMGALITY) 120 MG/ML auto-injector pen, Inject 1 mL under the skin into the appropriate area as directed Every 30 (Thirty) Days., Disp: 1 mL, Rfl: 11  •  lidocaine (LIDODERM) 5 %, APPLY 1 PATCH AND LEAVE IN PLACE FOR 12 HOURS, THEN REMOVE AND LEAVE OFF FOR 12 HOURS, Disp: , Rfl:   •  lisinopril (PRINIVIL,ZESTRIL) 20 MG tablet, Take 20 mg by mouth Daily., Disp: , Rfl:   •  SUMAtriptan (IMITREX) 100 MG tablet, Take one tablet at onset of headache. May  "repeat dose one time in 2 hours if headache not relieved., Disp: 9 tablet, Rfl: 11  •  verapamil ER (VERELAN) 240 MG 24 hr capsule, Take 240 mg by mouth Daily., Disp: , Rfl:   •  loratadine (CLARITIN) 10 MG tablet, Take 10 mg by mouth Daily., Disp: , Rfl:      Review of Systems   Constitutional: Negative for chills, fatigue, fever and unexpected weight change.   HENT: Negative for ear pain, hearing loss, nosebleeds, rhinorrhea and sore throat.    Eyes: Negative for photophobia, pain, discharge, itching and visual disturbance.   Respiratory: Negative for cough, chest tightness, shortness of breath and wheezing.    Cardiovascular: Negative for chest pain, palpitations and leg swelling.   Gastrointestinal: Negative for abdominal pain, blood in stool, constipation, diarrhea, nausea and vomiting.   Genitourinary: Negative for dysuria, frequency, hematuria and urgency.   Musculoskeletal: Positive for neck pain. Negative for arthralgias, back pain, gait problem, joint swelling, myalgias and neck stiffness.   Skin: Negative for rash and wound.   Allergic/Immunologic: Negative for environmental allergies and food allergies.   Neurological: Positive for numbness and headaches. Negative for dizziness, tremors, seizures, syncope, speech difficulty, weakness and light-headedness.   Hematological: Negative for adenopathy. Does not bruise/bleed easily.   Psychiatric/Behavioral: Negative for agitation, confusion, decreased concentration, hallucinations, sleep disturbance and suicidal ideas. The patient is not nervous/anxious.    All other systems reviewed and are negative.       Objective:  /80   Pulse 90   Ht 165.1 cm (65\")   Wt 71.7 kg (158 lb)   SpO2 95%   BMI 26.29 kg/m²     Neurologic Exam     Mental Status   Oriented to person, place, and time.   Speech: speech is normal   Level of consciousness: alert    Cranial Nerves   Cranial nerves II through XII intact.     Motor Exam   Muscle bulk: normal  Overall muscle " tone: normal    Strength   Strength 5/5 throughout.     Gait, Coordination, and Reflexes     Gait  Gait: normal    Coordination   Finger to nose coordination: normal    Tremor   Resting tremor: absent  Intention tremor: absent  Action tremor: absent    Reflexes   Reflexes 2+ except as noted.   Right : 2+  Left : 2+      Physical Exam  Constitutional:       Appearance: Normal appearance.   Neck:     Musculoskeletal:      Cervical back: No edema, erythema, signs of trauma, rigidity, torticollis or crepitus. Pain with movement and muscular tenderness present. No spinous process tenderness. Decreased range of motion.   Neurological:      Mental Status: He is alert and oriented to person, place, and time.      Cranial Nerves: Cranial nerves 2-12 are intact.      Motor: Motor strength is normal.      Coordination: Finger-Nose-Finger Test normal.      Gait: Gait is intact.   Psychiatric:         Mood and Affect: Mood and affect normal.         Speech: Speech normal.         Behavior: Behavior normal.         Thought Content: Thought content normal.         Cognition and Memory: Cognition and memory normal.         Judgment: Judgment normal.     Results:    MRI of the cervical spine without contrast on 04/12/2022 did show multilevel degenerative disc changes, moderate-to-severe cervical spondylosis, also noted 2 mm tiny syrinx around the level of C5. Neurosurgery reviewed and did not recommend surgical intervention at this time.    Assessment/Plan:       Diagnoses and all orders for this visit:    1. Chronic migraine without aura without status migrainosus, not intractable (Primary)  -     galcanezumab-gnlm (EMGALITY) 120 MG/ML auto-injector pen; Inject 1 mL under the skin into the appropriate area as directed Every 30 (Thirty) Days.  Dispense: 1 mL; Refill: 11  -     SUMAtriptan (IMITREX) 100 MG tablet; Take one tablet at onset of headache. May repeat dose one time in 2 hours if headache not relieved.  Dispense: 9  tablet; Refill: 11  -     amitriptyline (ELAVIL) 10 MG tablet; Take 1 tablet by mouth every night at bedtime.  Dispense: 30 tablet; Refill: 11  -     Ambulatory Referral to Physical Therapy Evaluate and treat    2. Episodic cluster headache, not intractable  -     SUMAtriptan (IMITREX) 100 MG tablet; Take one tablet at onset of headache. May repeat dose one time in 2 hours if headache not relieved.  Dispense: 9 tablet; Refill: 11    3. Cervical spondylosis without myelopathy  -     Ambulatory Referral to Physical Therapy Evaluate and treat    I will reorder some physical therapy. He has decided not to follow up with pain management for now. He has found physical therapy helpful before. His headaches are much better overall. He will follow-up in 6 months or sooner if needed.     Reviewed medications, potential side effects and signs and symptoms to report. Discussed risk versus benefits of treatment plan with patient and/or family-including medications, labs and radiology that may be ordered. Addressed questions and concerns during visit. Patient and/or family verbalized understanding and agree with plan.    AS THE PROVIDER, I PERSONALLY WORE PPE DURING ENTIRE FACE TO FACE ENCOUNTER IN CLINIC WITH THE PATIENT. PATIENT ALSO WORE PPE DURING ENTIRE FACE TO FACE ENCOUNTER EXCEPT FOR A MAX OF 30 SECONDS DURING NEUROLOGICAL EVALUATION OF CRANIAL NERVES AND THEN MASK WAS PLACED BACK OVER PATIENT FACE FOR REMAINDER OF VISIT. I WASHED MY HANDS BEFORE AND AFTER VISIT.    During this visit the following were done:  Labs Reviewed []    Labs Ordered []    Radiology Reports Reviewed [x]    Radiology Ordered []    PCP Records Reviewed []    Referring Provider Records Reviewed []    ER Records Reviewed []    Hospital Records Reviewed []    History Obtained From Family []    Radiology Images Reviewed []    Other Reviewed [x]  Prior neuro notes, neurosurgery consult and pain management consult  Records Requested []      Transcribed  from ambient dictation for DELVIN Jimenez by Davida Alfonso.  12/01/22   12:45 EST    Patient or patient representative verbalized consent to the visit recording.  I have personally performed the services described in this document as transcribed by the above individual, and it is both accurate and complete.  DELVIN Jimenez  12/1/2022  12:55 EST

## 2023-01-04 ENCOUNTER — PRIOR AUTHORIZATION (OUTPATIENT)
Dept: NEUROLOGY | Facility: CLINIC | Age: 58
End: 2023-01-04
Payer: MEDICAID

## 2023-01-16 ENCOUNTER — HOSPITAL ENCOUNTER (OUTPATIENT)
Dept: PHYSICAL THERAPY | Facility: HOSPITAL | Age: 58
Setting detail: THERAPIES SERIES
Discharge: HOME | End: 2023-01-16
Payer: MEDICAID

## 2023-01-16 DIAGNOSIS — G43.709 CHRONIC MIGRAINE WITHOUT AURA WITHOUT STATUS MIGRAINOSUS, NOT INTRACTABLE: ICD-10-CM

## 2023-01-16 DIAGNOSIS — M47.812 CERVICAL SPONDYLOSIS WITHOUT MYELOPATHY: Primary | ICD-10-CM

## 2023-01-16 PROCEDURE — 97161 PT EVAL LOW COMPLEX 20 MIN: CPT
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
## 2023-01-25 ENCOUNTER — HOSPITAL ENCOUNTER (OUTPATIENT)
Dept: PHYSICAL THERAPY | Facility: HOSPITAL | Age: 58
Setting detail: THERAPIES SERIES
Discharge: HOME | End: 2023-01-25
Payer: MEDICAID

## 2023-01-25 DIAGNOSIS — M47.812 CERVICAL SPONDYLOSIS WITHOUT MYELOPATHY: Primary | ICD-10-CM

## 2023-01-25 DIAGNOSIS — G43.709 CHRONIC MIGRAINE WITHOUT AURA WITHOUT STATUS MIGRAINOSUS, NOT INTRACTABLE: ICD-10-CM

## 2023-01-25 PROCEDURE — 97140 MANUAL THERAPY 1/> REGIONS: CPT

## 2023-01-25 PROCEDURE — 97110 THERAPEUTIC EXERCISES: CPT
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
## 2023-02-08 ENCOUNTER — HOSPITAL ENCOUNTER (OUTPATIENT)
Dept: PHYSICAL THERAPY | Facility: HOSPITAL | Age: 58
Setting detail: THERAPIES SERIES
Discharge: HOME | End: 2023-02-08
Payer: MEDICAID

## 2023-02-08 DIAGNOSIS — M47.812 CERVICAL SPONDYLOSIS WITHOUT MYELOPATHY: Primary | ICD-10-CM

## 2023-02-08 PROCEDURE — 97140 MANUAL THERAPY 1/> REGIONS: CPT

## 2023-02-08 PROCEDURE — 97012 MECHANICAL TRACTION THERAPY: CPT

## 2023-02-08 PROCEDURE — 97110 THERAPEUTIC EXERCISES: CPT
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
## 2023-03-01 ENCOUNTER — HOSPITAL ENCOUNTER (OUTPATIENT)
Dept: PHYSICAL THERAPY | Facility: HOSPITAL | Age: 58
Discharge: HOME | End: 2023-03-01
Payer: MEDICAID

## 2023-03-27 ENCOUNTER — HOSPITAL ENCOUNTER (OUTPATIENT)
Dept: PHYSICAL THERAPY | Facility: HOSPITAL | Age: 58
Setting detail: THERAPIES SERIES
Discharge: HOME | End: 2023-03-27
Payer: MEDICAID

## 2023-03-27 DIAGNOSIS — G43.709 CHRONIC MIGRAINE WITHOUT AURA WITHOUT STATUS MIGRAINOSUS, NOT INTRACTABLE: ICD-10-CM

## 2023-03-27 DIAGNOSIS — M47.812 CERVICAL SPONDYLOSIS WITHOUT MYELOPATHY: Primary | ICD-10-CM

## 2023-03-27 PROCEDURE — 97140 MANUAL THERAPY 1/> REGIONS: CPT

## 2023-03-27 PROCEDURE — 97110 THERAPEUTIC EXERCISES: CPT
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
## 2023-04-11 ENCOUNTER — DOCUMENTATION (OUTPATIENT)
Dept: PHYSICAL THERAPY | Facility: HOSPITAL | Age: 58
End: 2023-04-11
Payer: MEDICAID

## 2023-04-11 DIAGNOSIS — M47.812 CERVICAL SPONDYLOSIS WITHOUT MYELOPATHY: Primary | ICD-10-CM

## 2023-04-11 DIAGNOSIS — G43.709 CHRONIC MIGRAINE WITHOUT AURA WITHOUT STATUS MIGRAINOSUS, NOT INTRACTABLE: ICD-10-CM

## 2023-04-11 NOTE — THERAPY DISCHARGE NOTE
Outpatient Physical Therapy Discharge Summary         Patient Name: Miky Curiel  : 1965  MRN: [id number]    Today's Date: 2023    Visit Dx:    ICD-10-CM ICD-9-CM   1. Cervical spondylosis without myelopathy  M47.812 721.0   2. Chronic migraine without aura without status migrainosus, not intractable  G43.709 346.70           OP PT Discharge Summary  Date of Discharge: 23  Reason for Discharge: Unable to participate  Outcomes Achieved: Patient able to partially acheive established goals  Discharge Destination: Home with home program  Discharge Instructions/Additional Comments: Patient made fair progress throughout his plan of care but will be discharged due to for no-show no calls throughout his plan of care and his last 2 visits consecutively.      Time Calculation:                    Theodore Hare, PT  2023

## 2023-06-16 ENCOUNTER — OFFICE VISIT (OUTPATIENT)
Dept: NEUROLOGY | Facility: CLINIC | Age: 58
End: 2023-06-16
Payer: MEDICAID

## 2023-06-16 VITALS
DIASTOLIC BLOOD PRESSURE: 82 MMHG | SYSTOLIC BLOOD PRESSURE: 138 MMHG | OXYGEN SATURATION: 98 % | HEART RATE: 78 BPM | HEIGHT: 65 IN | BODY MASS INDEX: 21.92 KG/M2 | WEIGHT: 131.6 LBS

## 2023-06-16 DIAGNOSIS — G44.019 EPISODIC CLUSTER HEADACHE, NOT INTRACTABLE: ICD-10-CM

## 2023-06-16 DIAGNOSIS — M25.512 CHRONIC LEFT SHOULDER PAIN: ICD-10-CM

## 2023-06-16 DIAGNOSIS — G89.29 CHRONIC LEFT SHOULDER PAIN: ICD-10-CM

## 2023-06-16 DIAGNOSIS — G56.03 BILATERAL CARPAL TUNNEL SYNDROME: Primary | ICD-10-CM

## 2023-06-16 DIAGNOSIS — G89.29 CHRONIC RIGHT-SIDED LOW BACK PAIN WITH RIGHT-SIDED SCIATICA: ICD-10-CM

## 2023-06-16 DIAGNOSIS — M47.812 CERVICAL SPONDYLOSIS WITHOUT MYELOPATHY: ICD-10-CM

## 2023-06-16 DIAGNOSIS — G43.709 CHRONIC MIGRAINE WITHOUT AURA WITHOUT STATUS MIGRAINOSUS, NOT INTRACTABLE: ICD-10-CM

## 2023-06-16 DIAGNOSIS — E78.5 HYPERLIPIDEMIA, UNSPECIFIED HYPERLIPIDEMIA TYPE: ICD-10-CM

## 2023-06-16 DIAGNOSIS — M54.41 CHRONIC RIGHT-SIDED LOW BACK PAIN WITH RIGHT-SIDED SCIATICA: ICD-10-CM

## 2023-06-16 DIAGNOSIS — I10 ESSENTIAL HYPERTENSION: ICD-10-CM

## 2023-06-16 PROCEDURE — 1160F RVW MEDS BY RX/DR IN RCRD: CPT | Performed by: NURSE PRACTITIONER

## 2023-06-16 PROCEDURE — 1159F MED LIST DOCD IN RCRD: CPT | Performed by: NURSE PRACTITIONER

## 2023-06-16 PROCEDURE — 3075F SYST BP GE 130 - 139MM HG: CPT | Performed by: NURSE PRACTITIONER

## 2023-06-16 PROCEDURE — 99214 OFFICE O/P EST MOD 30 MIN: CPT | Performed by: NURSE PRACTITIONER

## 2023-06-16 PROCEDURE — 3079F DIAST BP 80-89 MM HG: CPT | Performed by: NURSE PRACTITIONER

## 2023-06-16 RX ORDER — AMITRIPTYLINE HYDROCHLORIDE 10 MG/1
10 TABLET, FILM COATED ORAL
Qty: 30 TABLET | Refills: 11 | Status: SHIPPED | OUTPATIENT
Start: 2023-06-16

## 2023-06-16 RX ORDER — CYCLOBENZAPRINE HCL 10 MG
10 TABLET ORAL 3 TIMES DAILY PRN
Qty: 30 TABLET | Refills: 0 | Status: SHIPPED | OUTPATIENT
Start: 2023-06-16

## 2023-06-16 RX ORDER — SUMATRIPTAN 100 MG/1
TABLET, FILM COATED ORAL
Qty: 9 TABLET | Refills: 11 | Status: SHIPPED | OUTPATIENT
Start: 2023-06-16

## 2023-06-16 NOTE — LETTER
June 16, 2023     Ray Florez MD  [address] [place] [id number]    Patient: Miky Curiel   YOB: 1965   Date of Visit: 6/16/2023       Dear Ray Folrez MD    Miky Curiel was in my office today. Below is a copy of my note.    If you have questions, please do not hesitate to call me. I look forward to following Miky along with you.         Sincerely,        DELVIN Jimenez        CC: Dimitry Duong MD    Subjective:     Patient ID: Miky Curiel is a 57 y.o. male.    CC:   Chief Complaint   Patient presents with   • Migraine   • Numbness     In hands       HPI:   History of Present Illness   Today 6/16/2023-  This is a 57-year-old male who presents for 6-month neurology follow-up on long-term chronic migraine headaches present for several years.     He has also been found to have carpal tunnel syndrome, has also been found to have cervical degenerative disc changes and has been evaluated by [facility], [place], operated by [facility] neurosurgery, has also been evaluated by pain management. He has recently completed physical therapy for his symptoms.     He has reported some more numbness and tingling in the hands.     In regards to migraines, he is on Emgality injections monthly for migraine prevention. He continues to take amitriptyline 10 mg at night for prevention, sumatriptan at onset of migraine.     He is also currently on lidocaine patches, Flexeril, and diclofenac prescribed by an outlying provider. He has also had episodic cluster headaches in the past for which he takes sumatriptan.     At last visit, we reordered physical therapy, and he has been going to [facility] physical therapy and completed this on 04/11/2023 for his cervical spondylosis. He is here for follow-up and reevaluation of symptoms today.    Today, he reports that the physical therapy was helpful for his neck and the pain has improved. He states that he is still having some numbness and  tingling in his hands. He states that the numbness is constant in his right hand and first four fingers. He also notes numbness in the first 2 fingers of his left hand. He reports that when he drops his keys and picks them back up, he has a handful of rocks in his hand but does not realize until he looks down due to not being able to feel his right hand. He reports that even while trying to drive, his entire hand goes numb on the right side. He notes that he was told that he has severe carpal tunnel in his right hand. He states that he would like to try to go to orthopedic specialist about injections or surgery. He reports that he has a wrist splint for his left hand, but it is too tight. He states that he needs a wrist splint for his right hand.    He also wants a referral back to pain management again to discuss cervical injections. He reports that the injections were the only thing that helped with his low back pain last year. He states that surgery was going to be the next option if the injections did not work for his back.     He saw Dr. Musa on 05/02/2022 and was referred to Dr. Florez in pain management, which he saw on 06/06/2022 and has not followed up since. He reports that he did not go for the pain management procedure because he did not have someone to take him.     He reports that his migraines are rare now. He states that they improved after starting his blood pressure medication and monthly Emgality injections. He states that he uses sumatriptan as needed but has only taken 2 in the last year.    He reports that he does not have a primary care physician anymore. He states that he missed 3 appointments within a year. He reports that he takes Flexeril for his low back pain which was prescribed by his primary care physician.    He reports that he has had severe intermittent left shoulder pain for a long time. He states that when he does experience shoulder pain, it lasts for approximately 2 weeks.  He states it affects his sleep and ability to lift.    He reports that he has lost weight but not on purpose. He states that he has been remodeling his house for the past year and has changed his eating habits. He states that he eats lighter now and eats more salads and fruit. He states that he drinks 3 cans of beer a day. He reports that he still smokes occasionally.     Prior Neurology notes and workup from previous provider:  He told me that he has had headaches ongoing for a couple years at least.  He had been told in the past that these were migraine and possibly cluster headaches.  He said that typically when he has a headache he will have pain behind either eye that will start as a throbbing sensation and then spread to holoacranial discomfort.  He at times also has associated photo and phonophobia but he never had any nausea or vomiting. His headaches can be episodic, he may go a month or so without having any headaches, he may have 1 or 2 headaches per month and he may have 3 to 4 in a week.  He had never identified any patterns or triggers to his headaches.  He had not noticed that his headaches are worsened during certain months of the year.  He does at times have watering eyes but he has this bilaterally as he complains of chronic allergies.  He has been on verapamil for quite some time for both blood pressure and headache control. He had also taken propranolol in the past.  He denied any ptosis or facial droop associated with his headaches.    He has had an eye exam and all was fine by report. He has occasional dizziness but this is rare.  He denied any confusional episodes, seizures, dysarthria or stroke symptoms.      I did order MRI of the brain, this has since been done as well as MRA and both were read as normal per radiology-completed 6/2/2021 at  [facility].       EMG completed 1/11/2022 [facility]- he was complaining of some tingling in his left arm, he had a history of carpal tunnel.  EMG confirms severe  carpal tunnel, he saw Ortho who did not feel like his carpal tunnel was symptomatic.  EMG did not show evidence of radiculopathy however he continues to have some discomfort from his neck shooting down to his hand.  Denies weakness of his upper extremities, denies problems with balance or falls.    MRI of the cervical spine without contrast on 04/12/2022 did show multilevel degenerative disc changes, moderate-to-severe cervical spondylosis, also noted 2 mm tiny syrinx around the level of C5. Neurosurgery reviewed and did not recommend surgical intervention at this time. He was referred to Dr. Magdi Musa with neurosurgery and was evaluated on 05/02/2022. He reviewed the MRI of his cervical spine. He referred him to physical therapy as well as pain management. He did review the signs and symptoms of cervical radiculopathy and myelopathy in the case that his symptoms worsened or he needed to follow up with them again.     He was previously seen in clinic on 03/09/2022 by DELVIN Alcantara, who is no longer working in our clinic.     The following portions of the patient's history were reviewed and updated as appropriate: allergies, current medications, past family history, past medical history, past social history, past surgical history, and problem list.    Past Medical History:   Diagnosis Date   • Cluster headache    • Hyperlipidemia    • Hypertension    • Kidney stone        Past Surgical History:   Procedure Laterality Date   • ANKLE SURGERY     • CYSTOSCOPY BLADDER STONE LITHOTRIPSY         Social History     Socioeconomic History   • Marital status: Single   Tobacco Use   • Smoking status: Light Smoker     Types: Cigars     Passive exposure: Past   • Smokeless tobacco: Never   Vaping Use   • Vaping Use: Never used   Substance and Sexual Activity   • Alcohol use: Yes     Alcohol/week: 24.0 standard drinks     Types: 24 Cans of beer per week   • Drug use: No   • Sexual activity: Yes       Family  "History   Problem Relation Age of Onset   • Cancer Mother    • Dementia Father    • Hypertension Father    • Hyperlipidemia Father    • Heart failure Father           Current Outpatient Medications:   •  amitriptyline (ELAVIL) 10 MG tablet, Take 1 tablet by mouth every night at bedtime., Disp: 30 tablet, Rfl: 11  •  atorvastatin (LIPITOR) 20 MG tablet, Take 1 tablet by mouth Every Night., Disp: , Rfl:   •  chlorthalidone (HYGROTON) 25 MG tablet, Take 1 tablet by mouth Daily., Disp: , Rfl:   •  cyclobenzaprine (FLEXERIL) 10 MG tablet, Take 1 tablet by mouth 3 (Three) Times a Day As Needed for Muscle Spasms., Disp: 30 tablet, Rfl: 0  •  diclofenac sodium (VOTAREN XR) 100 MG 24 hr tablet, TAKE ONE TABLET BY MOUTH DAILY AS NEEDED FOR SEVERE PAIN, Disp: , Rfl:   •  fluticasone (FLONASE) 50 MCG/ACT nasal spray, 2 sprays by Each Nare route Daily., Disp: , Rfl:   •  galcanezumab-gnlm (EMGALITY) 120 MG/ML auto-injector pen, Inject 1 mL under the skin into the appropriate area as directed Every 30 (Thirty) Days., Disp: 1 mL, Rfl: 11  •  lidocaine (LIDODERM) 5 %, APPLY 1 PATCH AND LEAVE IN PLACE FOR 12 HOURS, THEN REMOVE AND LEAVE OFF FOR 12 HOURS, Disp: , Rfl:   •  lisinopril (PRINIVIL,ZESTRIL) 20 MG tablet, Take 1 tablet by mouth Daily., Disp: , Rfl:   •  loratadine (CLARITIN) 10 MG tablet, Take 1 tablet by mouth Daily., Disp: , Rfl:   •  SUMAtriptan (IMITREX) 100 MG tablet, Take one tablet at onset of headache. May repeat dose one time in 2 hours if headache not relieved., Disp: 9 tablet, Rfl: 11  •  verapamil ER (VERELAN) 240 MG 24 hr capsule, Take 1 capsule by mouth Daily., Disp: , Rfl:      Review of Systems   Musculoskeletal:  Positive for back pain and neck pain.   Neurological:  Positive for numbness.   All other systems reviewed and are negative.     Objective:  /82   Pulse 78   Ht 165.1 cm (65\")   Wt 59.7 kg (131 lb 9.6 oz)   SpO2 98%   BMI 21.90 kg/m²     Neurologic Exam     Mental Status   Oriented to " person, place, and time.   Speech: speech is normal   Level of consciousness: alert  Knowledge: good.   Normal comprehension.     Cranial Nerves   Cranial nerves II through XII intact.     Motor Exam   Muscle bulk: normal  Overall muscle tone: normal    Strength   Strength 5/5 throughout.     Sensory Exam   Right arm light touch: decreased from fingers  Left arm light touch: normal  Right arm vibration: normal  Left arm vibration: normal  Right arm pinprick: decreased from fingers  Left arm pinprick: normal    +tinel's bilateral wrists R>L, no atrophy of hands     Gait, Coordination, and Reflexes     Gait  Gait: normal    Coordination   Finger to nose coordination: normal    Tremor   Resting tremor: absent  Intention tremor: absent  Action tremor: absent    Reflexes   Right brachioradialis: 2+  Left brachioradialis: 2+  Right biceps: 2+  Left biceps: 2+  Right triceps: 2+  Left triceps: 2+  Right patellar: 2+  Left patellar: 2+  Right achilles: 2+  Left achilles: 2+  Right : 2+  Left : 2+  Right Chang: absent  Left Chang: absent  Right ankle clonus: absent  Left ankle clonus: absent    Physical Exam  Constitutional:       Appearance: Normal appearance.   Musculoskeletal:      Cervical back: Crepitus present. No edema, erythema, signs of trauma, rigidity or torticollis. Pain with movement and muscular tenderness present. No spinous process tenderness. Decreased range of motion.      Comments:   Reports chronic low back pain, stable   Neurological:      Mental Status: He is alert and oriented to person, place, and time.      Cranial Nerves: Cranial nerves 2-12 are intact.      Motor: Motor strength is normal.      Coordination: Finger-Nose-Finger Test normal.      Gait: Gait is intact.      Deep Tendon Reflexes:      Reflex Scores:       Tricep reflexes are 2+ on the right side and 2+ on the left side.       Bicep reflexes are 2+ on the right side and 2+ on the left side.       Brachioradialis reflexes are  2+ on the right side and 2+ on the left side.       Patellar reflexes are 2+ on the right side and 2+ on the left side.       Achilles reflexes are 2+ on the right side and 2+ on the left side.  Psychiatric:         Mood and Affect: Mood and affect normal.         Speech: Speech normal.         Behavior: Behavior normal.         Thought Content: Thought content normal.         Cognition and Memory: Cognition and memory normal.         Judgment: Judgment normal.       Assessment/Plan:       Diagnoses and all orders for this visit:    1. Bilateral carpal tunnel syndrome (Primary)  -     Ambulatory Referral to Orthopedic Surgery    2. Cervical spondylosis without myelopathy  -     Ambulatory Referral to Pain Management Clinic    3. Chronic migraine without aura without status migrainosus, not intractable  -     amitriptyline (ELAVIL) 10 MG tablet; Take 1 tablet by mouth every night at bedtime.  Dispense: 30 tablet; Refill: 11  -     galcanezumab-gnlm (EMGALITY) 120 MG/ML auto-injector pen; Inject 1 mL under the skin into the appropriate area as directed Every 30 (Thirty) Days.  Dispense: 1 mL; Refill: 11  -     SUMAtriptan (IMITREX) 100 MG tablet; Take one tablet at onset of headache. May repeat dose one time in 2 hours if headache not relieved.  Dispense: 9 tablet; Refill: 11    4. Episodic cluster headache, not intractable  -     SUMAtriptan (IMITREX) 100 MG tablet; Take one tablet at onset of headache. May repeat dose one time in 2 hours if headache not relieved.  Dispense: 9 tablet; Refill: 11    5. Chronic right-sided low back pain with right-sided sciatica  -     cyclobenzaprine (FLEXERIL) 10 MG tablet; Take 1 tablet by mouth 3 (Three) Times a Day As Needed for Muscle Spasms.  Dispense: 30 tablet; Refill: 0    6. Chronic left shoulder pain  -     Ambulatory Referral to Orthopedic Surgery    7. Hyperlipidemia, unspecified hyperlipidemia type  -     Ambulatory Referral to Internal Medicine    8. Essential  hypertension  -     Ambulatory Referral to Internal Medicine    We have placed multiple referrals. We reviewed the plan moving forward.     In regards to his neck pain, it certainly improved with physical therapy. He does want to go back to pain management for injections, so I have placed that referral.     He would like to establish care with PCP with [facility], so I have also placed that referral to [facility] per his request.     Also, referral back to orthopedics for possible carpal tunnel release and getting new wrist splints. He has one for his left wrist, but not one for the right. His symptoms are now more localized to the right hand with numbness and is not radiating from the neck.     He will continue his current treatments for his migraines, which are very well controlled.     I did refill his Flexeril one time for his chronic low back pain, which he uses as needed.     He will follow up in our clinic in 6 months or sooner if needed. He verbalized understanding and agrees with plan moving forward today.     Reviewed medications, potential side effects and signs and symptoms to report. Discussed risk versus benefits of treatment plan with patient and/or family-including medications, labs and radiology that may be ordered. Addressed questions and concerns during visit. Patient and/or family verbalized understanding and agree with plan.    During this visit the following were done:  Labs Reviewed []    Labs Ordered []    Radiology Reports Reviewed []    Radiology Ordered []    PCP Records Reviewed []    Referring Provider Records Reviewed []    ER Records Reviewed []    Hospital Records Reviewed []    History Obtained From Family []    Radiology Images Reviewed []    Other Reviewed [x] Physical therapy notes reviewed, ortho notes, pain management and neurosurgery notes   Records Requested []      Transcribed from ambient dictation for DELVIN Jimenez by Bernardo  Jameson.  06/16/23   14:05 EDT    Patient or patient representative verbalized consent to the visit recording.  I have personally performed the services described in this document as transcribed by the above individual, and it is both accurate and complete.  Lin Triplett Shabana, APRN  6/16/2023  14:41 EDT      Note to patient: The 21st Century Cures Act makes medical notes like these available to patients in the interest of transparency. However, be advised this is a medical document. It is intended as peer to peer communication. It is written in medical language and may contain abbreviations or verbiage that are unfamiliar. It may appear blunt or direct. Medical documents are intended to carry relevant information, facts as evident, and the clinical opinion of the provider.

## 2023-06-16 NOTE — PROGRESS NOTES
Subjective:     Patient ID: Miky Curiel is a 57 y.o. male.    CC:   Chief Complaint   Patient presents with    Migraine    Numbness     In hands       HPI:   History of Present Illness   Today 6/16/2023-  This is a 57-year-old male who presents for 6-month neurology follow-up on long-term chronic migraine headaches present for several years.     He has also been found to have carpal tunnel syndrome, has also been found to have cervical degenerative disc changes and has been evaluated by [facility] neurosurgery, has also been evaluated by pain management. He has recently completed physical therapy for his symptoms.     He has reported some more numbness and tingling in the hands.     In regards to migraines, he is on Emgality injections monthly for migraine prevention. He continues to take amitriptyline 10 mg at night for prevention, sumatriptan at onset of migraine.     He is also currently on lidocaine patches, Flexeril, and diclofenac prescribed by an outlying provider. He has also had episodic cluster headaches in the past for which he takes sumatriptan.     At last visit, we reordered physical therapy, and he has been going to [facility] physical therapy and completed this on 04/11/2023 for his cervical spondylosis. He is here for follow-up and reevaluation of symptoms today.    Today, he reports that the physical therapy was helpful for his neck and the pain has improved. He states that he is still having some numbness and tingling in his hands. He states that the numbness is constant in his right hand and first four fingers. He also notes numbness in the first 2 fingers of his left hand. He reports that when he drops his keys and picks them back up, he has a handful of rocks in his hand but does not realize until he looks down due to not being able to feel his right hand. He reports that even while trying to drive, his entire hand goes numb on the right side. He notes that he was told that he has  severe carpal tunnel in his right hand. He states that he would like to try to go to orthopedic specialist about injections or surgery. He reports that he has a wrist splint for his left hand, but it is too tight. He states that he needs a wrist splint for his right hand.    He also wants a referral back to pain management again to discuss cervical injections. He reports that the injections were the only thing that helped with his low back pain last year. He states that surgery was going to be the next option if the injections did not work for his back.     He saw Dr. Musa on 05/02/2022 and was referred to Dr. Florez in pain management, which he saw on 06/06/2022 and has not followed up since. He reports that he did not go for the pain management procedure because he did not have someone to take him.     He reports that his migraines are rare now. He states that they improved after starting his blood pressure medication and monthly Emgality injections. He states that he uses sumatriptan as needed but has only taken 2 in the last year.    He reports that he does not have a primary care physician anymore. He states that he missed 3 appointments within a year. He reports that he takes Flexeril for his low back pain which was prescribed by his primary care physician.    He reports that he has had severe intermittent left shoulder pain for a long time. He states that when he does experience shoulder pain, it lasts for approximately 2 weeks. He states it affects his sleep and ability to lift.    He reports that he has lost weight but not on purpose. He states that he has been remodeling his house for the past year and has changed his eating habits. He states that he eats lighter now and eats more salads and fruit. He states that he drinks 3 cans of beer a day. He reports that he still smokes occasionally.     Prior Neurology notes and workup from previous provider:  He told me that he has had headaches ongoing for a  couple years at least.  He had been told in the past that these were migraine and possibly cluster headaches.  He said that typically when he has a headache he will have pain behind either eye that will start as a throbbing sensation and then spread to holoacranial discomfort.  He at times also has associated photo and phonophobia but he never had any nausea or vomiting. His headaches can be episodic, he may go a month or so without having any headaches, he may have 1 or 2 headaches per month and he may have 3 to 4 in a week.  He had never identified any patterns or triggers to his headaches.  He had not noticed that his headaches are worsened during certain months of the year.  He does at times have watering eyes but he has this bilaterally as he complains of chronic allergies.  He has been on verapamil for quite some time for both blood pressure and headache control. He had also taken propranolol in the past.  He denied any ptosis or facial droop associated with his headaches.    He has had an eye exam and all was fine by report. He has occasional dizziness but this is rare.  He denied any confusional episodes, seizures, dysarthria or stroke symptoms.      I did order MRI of the brain, this has since been done as well as MRA and both were read as normal per radiology-completed 6/2/2021 at [facility].       EMG completed 1/11/2022 [facility]- he was complaining of some tingling in his left arm, he had a history of carpal tunnel.  EMG confirms severe carpal tunnel, he saw Ortho who did not feel like his carpal tunnel was symptomatic.  EMG did not show evidence of radiculopathy however he continues to have some discomfort from his neck shooting down to his hand.  Denies weakness of his upper extremities, denies problems with balance or falls.    MRI of the cervical spine without contrast on 04/12/2022 did show multilevel degenerative disc changes, moderate-to-severe cervical spondylosis, also noted 2 mm tiny syrinx around the  level of C5. Neurosurgery reviewed and did not recommend surgical intervention at this time. He was referred to Dr. Magdi Musa with neurosurgery and was evaluated on 05/02/2022. He reviewed the MRI of his cervical spine. He referred him to physical therapy as well as pain management. He did review the signs and symptoms of cervical radiculopathy and myelopathy in the case that his symptoms worsened or he needed to follow up with them again.     He was previously seen in clinic on 03/09/2022 by DELVIN Alcantara, who is no longer working in our clinic.     The following portions of the patient's history were reviewed and updated as appropriate: allergies, current medications, past family history, past medical history, past social history, past surgical history, and problem list.    Past Medical History:   Diagnosis Date    Cluster headache     Hyperlipidemia     Hypertension     Kidney stone        Past Surgical History:   Procedure Laterality Date    ANKLE SURGERY      CYSTOSCOPY BLADDER STONE LITHOTRIPSY         Social History     Socioeconomic History    Marital status: Single   Tobacco Use    Smoking status: Light Smoker     Types: Cigars     Passive exposure: Past    Smokeless tobacco: Never   Vaping Use    Vaping Use: Never used   Substance and Sexual Activity    Alcohol use: Yes     Alcohol/week: 24.0 standard drinks     Types: 24 Cans of beer per week    Drug use: No    Sexual activity: Yes       Family History   Problem Relation Age of Onset    Cancer Mother     Dementia Father     Hypertension Father     Hyperlipidemia Father     Heart failure Father           Current Outpatient Medications:     amitriptyline (ELAVIL) 10 MG tablet, Take 1 tablet by mouth every night at bedtime., Disp: 30 tablet, Rfl: 11    atorvastatin (LIPITOR) 20 MG tablet, Take 1 tablet by mouth Every Night., Disp: , Rfl:     chlorthalidone (HYGROTON) 25 MG tablet, Take 1 tablet by mouth Daily., Disp: , Rfl:      "cyclobenzaprine (FLEXERIL) 10 MG tablet, Take 1 tablet by mouth 3 (Three) Times a Day As Needed for Muscle Spasms., Disp: 30 tablet, Rfl: 0    diclofenac sodium (VOTAREN XR) 100 MG 24 hr tablet, TAKE ONE TABLET BY MOUTH DAILY AS NEEDED FOR SEVERE PAIN, Disp: , Rfl:     fluticasone (FLONASE) 50 MCG/ACT nasal spray, 2 sprays by Each Nare route Daily., Disp: , Rfl:     galcanezumab-gnlm (EMGALITY) 120 MG/ML auto-injector pen, Inject 1 mL under the skin into the appropriate area as directed Every 30 (Thirty) Days., Disp: 1 mL, Rfl: 11    lidocaine (LIDODERM) 5 %, APPLY 1 PATCH AND LEAVE IN PLACE FOR 12 HOURS, THEN REMOVE AND LEAVE OFF FOR 12 HOURS, Disp: , Rfl:     lisinopril (PRINIVIL,ZESTRIL) 20 MG tablet, Take 1 tablet by mouth Daily., Disp: , Rfl:     loratadine (CLARITIN) 10 MG tablet, Take 1 tablet by mouth Daily., Disp: , Rfl:     SUMAtriptan (IMITREX) 100 MG tablet, Take one tablet at onset of headache. May repeat dose one time in 2 hours if headache not relieved., Disp: 9 tablet, Rfl: 11    verapamil ER (VERELAN) 240 MG 24 hr capsule, Take 1 capsule by mouth Daily., Disp: , Rfl:      Review of Systems   Musculoskeletal:  Positive for back pain and neck pain.   Neurological:  Positive for numbness.   All other systems reviewed and are negative.     Objective:  /82   Pulse 78   Ht 165.1 cm (65\")   Wt 59.7 kg (131 lb 9.6 oz)   SpO2 98%   BMI 21.90 kg/m²     Neurologic Exam     Mental Status   Oriented to person, place, and time.   Speech: speech is normal   Level of consciousness: alert  Knowledge: good.   Normal comprehension.     Cranial Nerves   Cranial nerves II through XII intact.     Motor Exam   Muscle bulk: normal  Overall muscle tone: normal    Strength   Strength 5/5 throughout.     Sensory Exam   Right arm light touch: decreased from fingers  Left arm light touch: normal  Right arm vibration: normal  Left arm vibration: normal  Right arm pinprick: decreased from fingers  Left arm pinprick: " normal    +tinel's bilateral wrists R>L, no atrophy of hands     Gait, Coordination, and Reflexes     Gait  Gait: normal    Coordination   Finger to nose coordination: normal    Tremor   Resting tremor: absent  Intention tremor: absent  Action tremor: absent    Reflexes   Right brachioradialis: 2+  Left brachioradialis: 2+  Right biceps: 2+  Left biceps: 2+  Right triceps: 2+  Left triceps: 2+  Right patellar: 2+  Left patellar: 2+  Right achilles: 2+  Left achilles: 2+  Right : 2+  Left : 2+  Right Chang: absent  Left Chang: absent  Right ankle clonus: absent  Left ankle clonus: absent    Physical Exam  Constitutional:       Appearance: Normal appearance.   Musculoskeletal:      Cervical back: Crepitus present. No edema, erythema, signs of trauma, rigidity or torticollis. Pain with movement and muscular tenderness present. No spinous process tenderness. Decreased range of motion.      Comments:   Reports chronic low back pain, stable   Neurological:      Mental Status: He is alert and oriented to person, place, and time.      Cranial Nerves: Cranial nerves 2-12 are intact.      Motor: Motor strength is normal.      Coordination: Finger-Nose-Finger Test normal.      Gait: Gait is intact.      Deep Tendon Reflexes:      Reflex Scores:       Tricep reflexes are 2+ on the right side and 2+ on the left side.       Bicep reflexes are 2+ on the right side and 2+ on the left side.       Brachioradialis reflexes are 2+ on the right side and 2+ on the left side.       Patellar reflexes are 2+ on the right side and 2+ on the left side.       Achilles reflexes are 2+ on the right side and 2+ on the left side.  Psychiatric:         Mood and Affect: Mood and affect normal.         Speech: Speech normal.         Behavior: Behavior normal.         Thought Content: Thought content normal.         Cognition and Memory: Cognition and memory normal.         Judgment: Judgment normal.       Assessment/Plan:       Diagnoses  and all orders for this visit:    1. Bilateral carpal tunnel syndrome (Primary)  -     Ambulatory Referral to Orthopedic Surgery    2. Cervical spondylosis without myelopathy  -     Ambulatory Referral to Pain Management Clinic    3. Chronic migraine without aura without status migrainosus, not intractable  -     amitriptyline (ELAVIL) 10 MG tablet; Take 1 tablet by mouth every night at bedtime.  Dispense: 30 tablet; Refill: 11  -     galcanezumab-gnlm (EMGALITY) 120 MG/ML auto-injector pen; Inject 1 mL under the skin into the appropriate area as directed Every 30 (Thirty) Days.  Dispense: 1 mL; Refill: 11  -     SUMAtriptan (IMITREX) 100 MG tablet; Take one tablet at onset of headache. May repeat dose one time in 2 hours if headache not relieved.  Dispense: 9 tablet; Refill: 11    4. Episodic cluster headache, not intractable  -     SUMAtriptan (IMITREX) 100 MG tablet; Take one tablet at onset of headache. May repeat dose one time in 2 hours if headache not relieved.  Dispense: 9 tablet; Refill: 11    5. Chronic right-sided low back pain with right-sided sciatica  -     cyclobenzaprine (FLEXERIL) 10 MG tablet; Take 1 tablet by mouth 3 (Three) Times a Day As Needed for Muscle Spasms.  Dispense: 30 tablet; Refill: 0    6. Chronic left shoulder pain  -     Ambulatory Referral to Orthopedic Surgery    7. Hyperlipidemia, unspecified hyperlipidemia type  -     Ambulatory Referral to Internal Medicine    8. Essential hypertension  -     Ambulatory Referral to Internal Medicine    We have placed multiple referrals. We reviewed the plan moving forward.     In regards to his neck pain, it certainly improved with physical therapy. He does want to go back to pain management for injections, so I have placed that referral.     He would like to establish care with PCP with [facility], so I have also placed that [place] [facility] [place]     Also, referral back to orthopedics for possible carpal  tunnel release and getting new wrist splints. He has one for his left wrist, but not one for the right. His symptoms are now more localized to the right hand with numbness and is not radiating from the neck.     He will continue his current treatments for his migraines, which are very well controlled.     I did refill his Flexeril one time for his chronic low back pain, which he uses as needed.     He will follow up in our clinic in 6 months or sooner if needed. He verbalized understanding and agrees with plan moving forward today.     Reviewed medications, potential side effects and signs and symptoms to report. Discussed risk versus benefits of treatment plan with patient and/or family-including medications, labs and radiology that may be ordered. Addressed questions and concerns during visit. Patient and/or family verbalized understanding and agree with plan.    During this visit the following were done:  Labs Reviewed []    Labs Ordered []    Radiology Reports Reviewed []    Radiology Ordered []    PCP Records Reviewed []    Referring Provider Records Reviewed []    ER Records Reviewed []    Hospital Records Reviewed []    History Obtained From Family []    Radiology Images Reviewed []    Other Reviewed [x] Physical therapy notes reviewed, ortho notes, pain management and neurosurgery notes   Records Requested []      Transcribed from ambient dictation for DELVIN Jimenez by Bernardo Barba.  06/16/23   14:05 EDT    Patient or patient representative verbalized consent to the visit recording.  I have personally performed the services described in this document as transcribed by the above individual, and it is both accurate and complete.  DELVIN Jimenez  6/16/2023  14:41 EDT      Note to patient: The 21st Century Cures Act makes medical notes like these available to patients in the interest of transparency. However, be advised this is a medical document. It is intended as peer to peer  communication. It is written in medical language and may contain abbreviations or verbiage that are unfamiliar. It may appear blunt or direct. Medical documents are intended to carry relevant information, facts as evident, and the clinical opinion of the provider.

## 2023-07-04 PROBLEM — M99.71 CONN TISS AND DISC STENOSIS OF INTVRT FORAMIN OF CERV REGION: Status: ACTIVE | Noted: 2023-07-04

## 2023-07-24 DIAGNOSIS — M47.812 CERVICAL SPONDYLOSIS WITHOUT MYELOPATHY: Primary | ICD-10-CM

## 2023-08-02 ENCOUNTER — OUTSIDE FACILITY SERVICE (OUTPATIENT)
Dept: PAIN MEDICINE | Facility: CLINIC | Age: 58
End: 2023-08-02
Payer: MEDICAID

## 2023-08-02 PROCEDURE — 64491 INJ PARAVERT F JNT C/T 2 LEV: CPT | Performed by: ANESTHESIOLOGY

## 2023-08-02 PROCEDURE — 64490 INJ PARAVERT F JNT C/T 1 LEV: CPT | Performed by: ANESTHESIOLOGY

## 2023-08-07 ENCOUNTER — TELEPHONE (OUTPATIENT)
Dept: PAIN MEDICINE | Facility: CLINIC | Age: 58
End: 2023-08-07
Payer: MEDICAID

## 2023-08-07 NOTE — TELEPHONE ENCOUNTER
FOLLOW-UP CALL AFTER PROCEDURE  I spoke with Miky Curiel regarding how he is feeling after procedure with Dr. Florez on 08/02/2023. Patient reports that he is doing great.  Pt underwent a diagnostic procedure (see procedure note for details) on 08/02/2023. Patient reported 100% pain relief at the time of after procedure exam with Dr. Florez. In addition, patient experienced significant functional improvement (performing activities without experiencing pain in comparison with examination prior to the procedure that reproduced pain with those activities).   Pain level before procedure: 8/10   Pain level after procedure: 0/10  Patient reports that the pain relief lasted for approximately 4-5 hours. Today, Miky Curiel reports 95% ongoing pain relief pain (pain level 2/10)  Patient denies side effects or complications  Patient does not have any questions or concerns at this time

## 2023-08-09 DIAGNOSIS — M47.812 CERVICAL SPONDYLOSIS WITHOUT MYELOPATHY: Primary | ICD-10-CM

## 2023-08-17 DIAGNOSIS — M47.812 CERVICAL SPONDYLOSIS WITHOUT MYELOPATHY: Primary | ICD-10-CM

## 2023-08-21 ENCOUNTER — TELEPHONE (OUTPATIENT)
Dept: PAIN MEDICINE | Facility: CLINIC | Age: 58
End: 2023-08-21

## 2023-08-21 NOTE — TELEPHONE ENCOUNTER
Hub staff attempted to follow warm transfer process and was unsuccessful     Caller: Miky Curiel    Relationship to patient: Self    Best call back number: [phone]    Patient is needing: PATIENT NEEDS TO R/S APPOINTMENT FOR TODAY.

## 2023-09-15 ENCOUNTER — OFFICE VISIT (OUTPATIENT)
Dept: FAMILY MEDICINE CLINIC | Facility: CLINIC | Age: 58
End: 2023-09-15
Payer: MEDICAID

## 2023-09-15 ENCOUNTER — PATIENT ROUNDING (BHMG ONLY) (OUTPATIENT)
Dept: FAMILY MEDICINE CLINIC | Facility: CLINIC | Age: 58
End: 2023-09-15
Payer: MEDICAID

## 2023-09-15 VITALS
SYSTOLIC BLOOD PRESSURE: 162 MMHG | HEART RATE: 59 BPM | TEMPERATURE: 97.3 F | DIASTOLIC BLOOD PRESSURE: 88 MMHG | WEIGHT: 134.4 LBS | BODY MASS INDEX: 22.39 KG/M2 | OXYGEN SATURATION: 98 % | HEIGHT: 65 IN | RESPIRATION RATE: 14 BRPM

## 2023-09-15 DIAGNOSIS — Z12.11 SCREENING FOR COLON CANCER: ICD-10-CM

## 2023-09-15 DIAGNOSIS — Z00.00 ROUTINE GENERAL MEDICAL EXAMINATION AT A HEALTH CARE FACILITY: Primary | ICD-10-CM

## 2023-09-15 DIAGNOSIS — Z12.5 SPECIAL SCREENING FOR MALIGNANT NEOPLASM OF PROSTATE: ICD-10-CM

## 2023-09-15 DIAGNOSIS — I10 ESSENTIAL HYPERTENSION: ICD-10-CM

## 2023-09-15 NOTE — PROGRESS NOTES
[facility] [place] has been sent to the patient for PATIENT ROUNDING with a My chart message

## 2023-09-15 NOTE — PROGRESS NOTES
Subjective   Miky Curiel is a 57 y.o. male  Establish Care, Hypertension, and Annual Exam (Prev PCP at )      History of Present Illness  Patient is a pleasant 57-year-old black male who comes as a new patient comes in for preventive medical examination patient denies any problems or complaints no shortness of breath no chest pain.  Does have ongoing history of hypertension, degenerative disc disease of the L-spine patient is due a screening colonoscopy  The following portions of the patient's history were reviewed and updated as appropriate: allergies, current medications, past social history and problem list    Review of Systems   Constitutional: Negative.    HENT: Negative.     Eyes: Negative.    Respiratory: Negative.     Cardiovascular: Negative.    Gastrointestinal: Negative.    Endocrine: Negative.    Genitourinary: Negative.    Musculoskeletal: Negative.    Skin: Negative.    Allergic/Immunologic: Negative.    Neurological: Negative.    Hematological: Negative.    Psychiatric/Behavioral: Negative.     All other systems reviewed and are negative.    Objective     Vitals:    09/15/23 1122   BP: 162/88   Pulse: 59   Resp: 14   Temp: 97.3 °F (36.3 °C)   SpO2: 98%       Physical Exam  Vitals and nursing note reviewed.   Constitutional:       General: He is not in acute distress.     Appearance: Normal appearance. He is well-developed. He is not ill-appearing, toxic-appearing or diaphoretic.   HENT:      Head: Normocephalic and atraumatic.      Right Ear: External ear normal.      Left Ear: External ear normal.   Eyes:      Conjunctiva/sclera: Conjunctivae normal.      Pupils: Pupils are equal, round, and reactive to light.   Neck:      Thyroid: No thyromegaly.      Vascular: No carotid bruit.   Cardiovascular:      Rate and Rhythm: Normal rate and regular rhythm.      Pulses: Normal pulses.      Heart sounds: Normal heart sounds. No murmur heard.  Pulmonary:      Effort: Pulmonary effort is normal. No  respiratory distress.      Breath sounds: Normal breath sounds.   Abdominal:      General: Bowel sounds are normal.      Palpations: Abdomen is soft. There is no mass.      Tenderness: There is no abdominal tenderness.   Musculoskeletal:         General: No swelling. Normal range of motion.      Cervical back: Normal range of motion and neck supple.   Lymphadenopathy:      Cervical: No cervical adenopathy.   Skin:     General: Skin is warm and dry.      Findings: No lesion or rash.   Neurological:      Mental Status: He is alert and oriented to person, place, and time.      Cranial Nerves: No cranial nerve deficit.      Sensory: No sensory deficit.      Motor: No weakness.      Coordination: Coordination normal.      Gait: Gait normal.      Deep Tendon Reflexes: Reflexes are normal and symmetric.   Psychiatric:         Mood and Affect: Mood normal.         Behavior: Behavior normal.         Thought Content: Thought content normal.         Judgment: Judgment normal.       Assessment & Plan     Diagnoses and all orders for this visit:    1. Routine general medical examination at a health care facility (Primary)  -     Comprehensive Metabolic Panel; Future  -     Lipid Panel; Future  -     TSH; Future  -     CBC & Differential; Future    2. Special screening for malignant neoplasm of prostate  -     PSA Screen; Future    3. Essential hypertension    4. Screening for colon cancer  -     Ambulatory Referral For Screening Colonoscopy     Preventive medicine discussed, diet, exercise, healthy living discussed at length.  Discussed nutrition, physical activity, healthy weight, injury prevention, misuse of tobacco, alcohol and drugs, dental health, mental health, immunizations, screening    Part of this note may be an electronic transcription/translation of spoken language to printed text using the Dragon Dictation System.

## 2023-09-19 DIAGNOSIS — M47.812 CERVICAL SPONDYLOSIS WITHOUT MYELOPATHY: Primary | ICD-10-CM

## 2023-09-20 ENCOUNTER — OUTSIDE FACILITY SERVICE (OUTPATIENT)
Dept: PAIN MEDICINE | Facility: CLINIC | Age: 58
End: 2023-09-20

## 2023-09-20 PROCEDURE — 64491 INJ PARAVERT F JNT C/T 2 LEV: CPT | Performed by: ANESTHESIOLOGY

## 2023-09-20 PROCEDURE — 64490 INJ PARAVERT F JNT C/T 1 LEV: CPT | Performed by: ANESTHESIOLOGY

## 2023-09-22 ENCOUNTER — TELEPHONE (OUTPATIENT)
Dept: PAIN MEDICINE | Facility: CLINIC | Age: 58
End: 2023-09-22
Payer: MEDICAID

## 2023-09-22 NOTE — TELEPHONE ENCOUNTER
"Spoke with pt to see how he's doing after his procedure Wednesday. Pt advised that he was \"doing really good\" and that his pain is about a 1/10. I advised pt of Dr. Florez being OON for [employer] and that the pt needs to contact them from the number on the back of the card to verify if he has OON benefits, and what the costs would be. Pt understood, and will call back.   "

## 2023-10-16 RX ORDER — SODIUM, POTASSIUM,MAG SULFATES 17.5-3.13G
2 SOLUTION, RECONSTITUTED, ORAL ORAL TAKE AS DIRECTED
Qty: 354 ML | Refills: 0 | Status: SHIPPED | OUTPATIENT
Start: 2023-10-16

## 2023-10-17 ENCOUNTER — PRIOR AUTHORIZATION (OUTPATIENT)
Dept: GASTROENTEROLOGY | Facility: CLINIC | Age: 58
End: 2023-10-17
Payer: MEDICAID

## 2023-10-17 NOTE — TELEPHONE ENCOUNTER
PA approval for [employer]    Approved today: 10/17/2023  The request has been approved. The authorization is effective from 10/17/2023 to 10/16/2024, as long as the member is enrolled in their current health plan. The request was approved as submitted. A written notification letter will follow with additional details.

## 2023-11-06 ENCOUNTER — OFFICE VISIT (OUTPATIENT)
Dept: FAMILY MEDICINE CLINIC | Facility: CLINIC | Age: 58
End: 2023-11-06
Payer: MEDICAID

## 2023-11-06 ENCOUNTER — LAB (OUTPATIENT)
Dept: LAB | Facility: HOSPITAL | Age: 58
End: 2023-11-06
Payer: MEDICAID

## 2023-11-06 VITALS
HEIGHT: 65 IN | RESPIRATION RATE: 14 BRPM | SYSTOLIC BLOOD PRESSURE: 158 MMHG | HEART RATE: 73 BPM | WEIGHT: 139.4 LBS | DIASTOLIC BLOOD PRESSURE: 78 MMHG | BODY MASS INDEX: 23.22 KG/M2 | OXYGEN SATURATION: 100 %

## 2023-11-06 DIAGNOSIS — G56.01 CARPAL TUNNEL SYNDROME ON RIGHT: Primary | ICD-10-CM

## 2023-11-06 DIAGNOSIS — M25.552 LEFT HIP PAIN: ICD-10-CM

## 2023-11-06 PROCEDURE — G0103 PSA SCREENING: HCPCS | Performed by: PHYSICIAN ASSISTANT

## 2023-11-06 PROCEDURE — 99214 OFFICE O/P EST MOD 30 MIN: CPT | Performed by: PHYSICIAN ASSISTANT

## 2023-11-06 PROCEDURE — 84443 ASSAY THYROID STIM HORMONE: CPT | Performed by: PHYSICIAN ASSISTANT

## 2023-11-06 PROCEDURE — 80061 LIPID PANEL: CPT | Performed by: PHYSICIAN ASSISTANT

## 2023-11-06 PROCEDURE — 3078F DIAST BP <80 MM HG: CPT | Performed by: PHYSICIAN ASSISTANT

## 2023-11-06 PROCEDURE — 3077F SYST BP >= 140 MM HG: CPT | Performed by: PHYSICIAN ASSISTANT

## 2023-11-06 PROCEDURE — 85025 COMPLETE CBC W/AUTO DIFF WBC: CPT | Performed by: PHYSICIAN ASSISTANT

## 2023-11-06 PROCEDURE — 80053 COMPREHEN METABOLIC PANEL: CPT | Performed by: PHYSICIAN ASSISTANT

## 2023-11-06 RX ORDER — PREGABALIN 75 MG/1
75 CAPSULE ORAL 2 TIMES DAILY
Qty: 28 CAPSULE | Refills: 1 | Status: SHIPPED | OUTPATIENT
Start: 2023-11-06

## 2023-11-06 RX ORDER — METHYLPREDNISOLONE 4 MG/1
TABLET ORAL
Qty: 21 TABLET | Refills: 0 | Status: SHIPPED | OUTPATIENT
Start: 2023-11-06

## 2023-11-06 NOTE — PROGRESS NOTES
Subjective   Miky Curiel is a 57 y.o. male  Carpal Tunnel (Rt hand/wrist. Had nerve conduction study Jan 2022-in chart.) and Hip Pain (Lt hip x2 weeks)      History of Present Illness  Patient is a pleasant 57-year-old black male who comes in complaint of right wrist pain nerve conduction study done on January 2022 showed severe carpal tunnel syndrome right wrist he was not seen by hand surgeon tried to do without having surgery pay states he cannot bear pain and numbness in his right hand any longer.      Patient also complains of left hip pain which is new pain with sitting standing for long periods of time sciatic-like pain in the hip patient has long history of degenerative disc disease in severe C-spine patient has pain in lower back and left hip.  Degenerative changes in L-spine      The following portions of the patient's history were reviewed and updated as appropriate: allergies, current medications, past social history and problem list    Review of Systems   Constitutional:  Negative for fatigue and unexpected weight change.   Respiratory:  Negative for cough, chest tightness and shortness of breath.    Cardiovascular:  Negative for chest pain, palpitations and leg swelling.   Gastrointestinal:  Negative for nausea.   Musculoskeletal:  Positive for arthralgias and back pain.   Skin:  Negative for color change and rash.   Neurological:  Negative for dizziness, syncope, weakness and headaches.       Objective     Vitals:    11/06/23 1050   BP: 158/78   Pulse: 73   Resp: 14   SpO2: 100%       Physical Exam  Vitals and nursing note reviewed.   Constitutional:       General: He is not in acute distress.     Appearance: Normal appearance. He is well-developed. He is not ill-appearing, toxic-appearing or diaphoretic.   Neck:      Vascular: No carotid bruit or JVD.   Cardiovascular:      Rate and Rhythm: Normal rate and regular rhythm.      Pulses: Normal pulses.      Heart sounds: Normal heart sounds. No  murmur heard.  Pulmonary:      Effort: Pulmonary effort is normal. No respiratory distress.      Breath sounds: Normal breath sounds.   Abdominal:      Palpations: Abdomen is soft.      Tenderness: There is no abdominal tenderness.   Musculoskeletal:      Right hip: Tenderness present. Decreased range of motion.   Skin:     General: Skin is warm and dry.   Neurological:      Mental Status: He is alert.         Assessment & Plan     Diagnoses and all orders for this visit:    1. Carpal tunnel syndrome on right (Primary)  -     Ambulatory Referral to Hand Surgery  -     methylPREDNISolone (MEDROL) 4 MG dose pack; Take as directed on package instructions.  Dispense: 21 tablet; Refill: 0  -     pregabalin (Lyrica) 75 MG capsule; Take 1 capsule by mouth 2 (Two) Times a Day.  Dispense: 28 capsule; Refill: 1    2. Left hip pain  -     methylPREDNISolone (MEDROL) 4 MG dose pack; Take as directed on package instructions.  Dispense: 21 tablet; Refill: 0  -     methylPREDNISolone (MEDROL) 4 MG dose pack; Take as directed on package instructions.  Dispense: 21 tablet; Refill: 0  -     pregabalin (Lyrica) 75 MG capsule; Take 1 capsule by mouth 2 (Two) Times a Day.  Dispense: 28 capsule; Refill: 1     As part of this patient's treatment plan, patient will be prescribed controlled substances. The patient has been made aware of appropriate use of such medications, including potential risk of somnolence, limited ability to drive and /or work safely, and potential for dependence or overdose. It has also been made clear that these medications are for use by this patient only, without concomitant use of alcohol or other substances unless prescribed.Controlled substance status of medication discussed with patient, discussed risks of medication including abuse potential and diversion potential and need to follow up for reevaluation appointment in order to receive further refills.    Part of this note may be an electronic  transcription/translation of spoken language to printed text using the Dragon Dictation System.

## 2023-11-09 ENCOUNTER — OFFICE VISIT (OUTPATIENT)
Dept: ORTHOPEDIC SURGERY | Facility: CLINIC | Age: 58
End: 2023-11-09
Payer: MEDICAID

## 2023-11-09 DIAGNOSIS — G56.03 BILATERAL CARPAL TUNNEL SYNDROME: Primary | ICD-10-CM

## 2023-11-09 NOTE — PROGRESS NOTES
[facility]     Office Visit       Date: 11/09/2023   Patient Name: Miky Curiel  MRN: [id number]  YOB: 1965    Referring Physician: Sandoval Corea,*     Chief Complaint:   Chief Complaint   Patient presents with    Right Hand - Pain     History of Present Illness:   Miky Curiel is a 57 y.o. male right-hand-dominant presented clinic as new patient complains of bilateral hand numbness and tingling.  This involves mostly the thumb index and long fingers.  This been present for several years and is gradually worsening with time.  He has had previous EMGs done that demonstrated carpal tunnel syndrome.  He has previously attempted brace wear and corticosteroid injections without sustained improvements.  He reports numbness and tingling was initially at night causing him to awaken but has slowly become persistent throughout the day.  He also endorses weakness of bilateral hands.  No injury or trauma.  No other complaints or concerns.    Subjective   Review of Systems:   Review of Systems   Constitutional: Negative.  Negative for chills, fatigue and fever.   HENT: Negative.  Negative for congestion and dental problem.    Eyes: Negative.  Negative for blurred vision.   Respiratory: Negative.  Negative for shortness of breath.    Cardiovascular: Negative.  Negative for leg swelling.   Gastrointestinal: Negative.  Negative for abdominal pain.   Endocrine: Negative.  Negative for polyuria.   Genitourinary: Negative.  Negative for difficulty urinating.   Musculoskeletal:  Positive for arthralgias.   Skin: Negative.    Allergic/Immunologic: Negative.    Neurological: Negative.    Hematological: Negative.  Negative for adenopathy.   Psychiatric/Behavioral: Negative.  Negative for behavioral problems.         Pertinent review of systems per HPI    I reviewed the patient's chief complaint, history of present illness,  review of systems, past medical history, surgical history, family history, social history, medications and allergy list in the EMR on 11/09/2023 and agree with the findings above.    Objective    Quality Measures:   ACP:   ACP discussion was declined by the patient.      Tobacco:   Miky Curiel  reports that he has been smoking cigars. He has been exposed to tobacco smoke. He has never used smokeless tobacco..     Vital Signs: There were no vitals filed for this visit.  BMI: BMI is within normal parameters. No other follow-up for BMI required.     General: No acute distress. Alert and oriented.     Ortho Exam:  Examination of bilateral upper extremities demonstrates no deformity.  There is atrophy of the thenar eminence right greater than left.  No hypothenar atrophy.  He is able to make a composite fist and oppose the thumb to the small finger.  The A1 pulleys remain nontender.  4/5 APB strength bilaterally.  5/5 FDI strength bilaterally. 2 pt discrimination greater than 15 mm on the right, 5 mm on the left.  Positive Durkan's and Phalen's bilaterally.  Positive Tinel's on the right and negative on the left.  Sensation is decreased at the median nerve distribution of the right hand and is intact throughout the left hand.  Brisk cap refill.  Warm well-perfused distally.    CTS-6 Questionnaire         Left Hand  Symptoms and History  Numbness in the median nerve territory  3.5 (3.5)   Nocturnal numbness     4 (4)   Physical Examination  Thenar atrophy and/or weakness   5 (5)    Positive Phalen's test     5 (5)   Loss of 2-point Discrimination >5 mm  4.5 (4.5)  Positive Tinel sign     0 (4)                Total    22 (26)           Right Hand  Symptoms and History  Numbness in the median nerve territory  3.5 (3.5)   Nocturnal numbness     4 (4)   Physical Examination  Thenar atrophy and/or weakness   5 (5)    Positive Phalen's test     5 (5)   Loss of 2-point Discrimination >5 mm  4.5 (4.5)  Positive Tinel  sign     4 (4)     Total    20 (26)    A patient with a score of > 12 has an 80% probability of electrodiagnostically positive carpal tunnel syndrome.     A patient with a score of > 5 has an 25% probability of electrodiagnostically positive carpal tunnel syndrome.                Imaging / Studies:    Imaging Results (Last 24 Hours)       ** No results found for the last 24 hours. **          Assessment / Plan    Assessment/Plan:   Miky Mcgee a 57 y.o. male with bilateral carpal tunnel syndrome.    I discussed with the patient their clinical and prior electrodiagnostic findings demonstrate carpal tunnel syndrome.  The pathophysiology of the condition, including compression of the median nerve in the carpal tunnel, was explained in detail.  It was also discussed that with more severe symptomatology, such as persistent and worsening paresthesias, that permanent nerve damage may result, which may make improvement after surgery less predictable.  Both conservative and surgical options were discussed.  Conservative treatments in the form of: observation, gentle nerve gliding exercises, night time splinting, and injection were presented.  Operative treatment in the form of open carpal tunnel release was presented and reiterated that the goal of surgery is to prevent further compression and damage to the nerve.  Further workup with electrodiagnostic studies was also discussed and recommended.  It has been several years since the patient's prior EMG studies and given that his symptoms have worsened I would like to get a new study.  The patient was agreeable with the plan.  Return to clinic with results and likely schedule right open carpal tunnel release at that time. All questions and concerns were addressed.        ICD-10-CM ICD-9-CM   1. Bilateral carpal tunnel syndrome  G56.03 354.0     Follow Up:   Return for Follow Up- After Testing.      Eusebia Morelos MD  [facility] [facility]

## 2023-11-27 RX ORDER — SODIUM, POTASSIUM,MAG SULFATES 17.5-3.13G
2 SOLUTION, RECONSTITUTED, ORAL ORAL TAKE AS DIRECTED
Qty: 354 ML | Refills: 0 | Status: SHIPPED | OUTPATIENT
Start: 2023-11-27

## 2024-01-11 ENCOUNTER — TELEPHONE (OUTPATIENT)
Dept: ORTHOPEDIC SURGERY | Facility: CLINIC | Age: 59
End: 2024-01-11
Payer: MEDICAID

## 2024-01-11 NOTE — TELEPHONE ENCOUNTER
*HUB OK TO READ AND SCHEDULE EMG F/U*    TRIED CALLING PATIENT, NO VM AVAIL, TO SCHEDULE EMG FOLLOW UP FOR B/L HANDS. EMG IS SCHEDULED FOR 1/15, SO WE WOULD LIKE TO SCHEDULE HIM FOR 1/17 OR LATER WITH DR KENNY.

## 2024-01-16 DIAGNOSIS — G56.03 BILATERAL CARPAL TUNNEL SYNDROME: ICD-10-CM

## 2024-01-18 ENCOUNTER — SPECIALTY PHARMACY (OUTPATIENT)
Dept: NEUROLOGY | Facility: CLINIC | Age: 59
End: 2024-01-18
Payer: MEDICAID

## 2024-01-22 ENCOUNTER — OFFICE VISIT (OUTPATIENT)
Dept: ORTHOPEDIC SURGERY | Facility: CLINIC | Age: 59
End: 2024-01-22
Payer: MEDICAID

## 2024-01-22 VITALS
BODY MASS INDEX: 22.19 KG/M2 | WEIGHT: 133.2 LBS | HEIGHT: 65 IN | SYSTOLIC BLOOD PRESSURE: 158 MMHG | DIASTOLIC BLOOD PRESSURE: 92 MMHG

## 2024-01-22 DIAGNOSIS — G56.03 BILATERAL CARPAL TUNNEL SYNDROME: Primary | ICD-10-CM

## 2024-01-22 PROCEDURE — 99214 OFFICE O/P EST MOD 30 MIN: CPT | Performed by: STUDENT IN AN ORGANIZED HEALTH CARE EDUCATION/TRAINING PROGRAM

## 2024-01-22 PROCEDURE — 1159F MED LIST DOCD IN RCRD: CPT | Performed by: STUDENT IN AN ORGANIZED HEALTH CARE EDUCATION/TRAINING PROGRAM

## 2024-01-22 PROCEDURE — 1160F RVW MEDS BY RX/DR IN RCRD: CPT | Performed by: STUDENT IN AN ORGANIZED HEALTH CARE EDUCATION/TRAINING PROGRAM

## 2024-01-22 PROCEDURE — 3080F DIAST BP >= 90 MM HG: CPT | Performed by: STUDENT IN AN ORGANIZED HEALTH CARE EDUCATION/TRAINING PROGRAM

## 2024-01-22 PROCEDURE — 3077F SYST BP >= 140 MM HG: CPT | Performed by: STUDENT IN AN ORGANIZED HEALTH CARE EDUCATION/TRAINING PROGRAM

## 2024-01-22 NOTE — PROGRESS NOTES
[facility]     Office Visit       Date: 01/22/2024   Patient Name: Miky Curiel  MRN: [id number]  YOB: 1965    Referring Physician: No ref. provider found     Chief Complaint:   Chief Complaint   Patient presents with    Follow-up     EMG bilateral upper extremities 1/15/24     History of Present Illness:   Miky Curiel is a 58 y.o. male right-hand-dominant presented clinic for follow-up of EMG study.  Since his last visit he reports no significant change in his symptoms.  He continues to endorse numbness and tingling to the thumb index and long fingers, right greater than left.  He has been using braces at night with minimal to no improvement.  He has also previously had injections that have not provided sustained relief.  He also endorses weakness to his hands with gripping and lifting objects.  No numbness or tingling to the small or ring fingers.  No injury or trauma.  No other complaints or concerns.    No personal history of diabetes.  No heart or lung disorders.    Subjective   Review of Systems:   Review of Systems   Constitutional:  Negative for chills, fever, unexpected weight gain and unexpected weight loss.   HENT:  Negative for congestion, postnasal drip and rhinorrhea.    Eyes:  Negative for blurred vision.   Respiratory:  Negative for shortness of breath.    Cardiovascular:  Negative for leg swelling.   Gastrointestinal:  Negative for abdominal pain, nausea and vomiting.   Genitourinary:  Negative for difficulty urinating.   Musculoskeletal:  Positive for arthralgias. Negative for gait problem, joint swelling and myalgias.   Skin:  Negative for skin lesions and wound.   Neurological:  Negative for dizziness, weakness, light-headedness and numbness.   Hematological:  Does not bruise/bleed easily.   Psychiatric/Behavioral:  Negative for depressed mood.       Pertinent review of systems per  "HPI    I reviewed the patient's chief complaint, history of present illness, review of systems, past medical history, surgical history, family history, social history, medications and allergy list in the EMR on 01/22/2024 and agree with the findings above.    Objective    Quality Measures:   ACP:   ACP discussion was declined by the patient.      Tobacco:   Miky Curiel  reports that he has been smoking cigars. He has been exposed to tobacco smoke. He has never used smokeless tobacco..     Vital Signs:   Vitals:    01/22/24 1126   BP: 158/92   Weight: 60.4 kg (133 lb 3.2 oz)   Height: 165.1 cm (65\")     BMI: BMI is within normal parameters. No other follow-up for BMI required.     General: No acute distress. Alert and oriented.     Ortho Exam:  Examination of bilateral upper extremities demonstrates no deformity.  There is atrophy of the right thenar eminence greater than the left.  No hypothenar atrophy.  The patient is able to make a composite fist and oppose the thumb to small fingers.  The hand and digits are nontender to palpation.  4/5 APB strength bilaterally.  5/5 FDI strength bilaterally.  Positive Durkan's and Phalen's bilaterally positive Tinel's on the right and negative on the left at the wrist.  Negative Tinel's at bilateral elbows.  The ulnar nerve is nontender to palpation with no subluxation.  Sensation is decreased throughout the median nerve distribution of the hands and intact throughout the ulnar nerve distributions of the hand.    Imaging / Studies:    Imaging Results (Last 24 Hours)       ** No results found for the last 24 hours. **        EMG nerve conduction study performed on 1/15/2024 was personally reviewed.  This demonstrates severe bilateral carpal tunnel and mild bilateral cubital tunnel.    Assessment / Plan    Assessment/Plan:   Miky Curiel is a 58 y.o. male bilateral carpal tunnel syndrome.    I discussed with the patient his clinical and electrodiagnostic studies " demonstrate severe bilateral carpal tunnel syndrome.  He has failed to improve with conservative measures such as bracing and prior corticosteroid injection.  He was most interested in definitive treatment at this time which I think is reasonable.  He would like to start with the right side and once recovered address the left.  I explained to him the risks, benefits, alternatives, prognosis to operative versus nonoperative treatment and he elects to proceed with right open carpal tunnel release.  Patient understands that with more severe symptoms and compression, complete resolution of symptoms is not guaranteed as the goal of surgery is to prevent progression of symptoms.    The risks and benefits of the procedure were discussed with the patient and/or appropriate guardian, which include but are not limited to: the risk of bleeding, pain, infection, wound complications, neurovascular damage, post-operative stiffness, tendon and/or ligament injury, persistent pain, need for additional surgeries in the future, and general risks from anesthesia. Carpal tunnel specific risks include: persistent numbness and tingling as the goal of surgery is to prevent further worsening of symptoms, damage to palmar cutaneous nerve, damage to recurrent motor branch as a portion of these branches are transligamentous, persistent weakness and pillar pain. We also discussed the post-operative rehabilitation, length of immobilization, the need for therapy, and the overall expected outcomes from the procedure. Proper time was given to answer the patient's questions regarding the procedure. The patient expressed understanding. Knowing what the risks are and what the conservative treatment is, the patient elected to forgo any further conservative treatment options and proceed with the surgical intervention. Surgical consent was obtained in the clinic and signed by myself and the patient.  He will follow-up with me postoperatively.       ICD-10-CM ICD-9-CM   1. Bilateral carpal tunnel syndrome  G56.03 354.0     Follow Up:   Return for Follow Up- After surgery.      Eusebia Morelos MD  [facility] [place] [facility]

## 2024-01-25 ENCOUNTER — SPECIALTY PHARMACY (OUTPATIENT)
Dept: NEUROLOGY | Facility: CLINIC | Age: 59
End: 2024-01-25
Payer: MEDICAID

## 2024-01-25 ENCOUNTER — TELEMEDICINE (OUTPATIENT)
Dept: NEUROLOGY | Facility: CLINIC | Age: 59
End: 2024-01-25
Payer: MEDICAID

## 2024-01-25 DIAGNOSIS — G56.03 BILATERAL CARPAL TUNNEL SYNDROME: Primary | ICD-10-CM

## 2024-01-25 DIAGNOSIS — M43.12 SPONDYLOLISTHESIS OF CERVICAL REGION: ICD-10-CM

## 2024-01-25 DIAGNOSIS — M54.41 CHRONIC RIGHT-SIDED LOW BACK PAIN WITH RIGHT-SIDED SCIATICA: ICD-10-CM

## 2024-01-25 DIAGNOSIS — G89.29 CHRONIC RIGHT-SIDED LOW BACK PAIN WITH RIGHT-SIDED SCIATICA: ICD-10-CM

## 2024-01-25 DIAGNOSIS — G43.709 CHRONIC MIGRAINE WITHOUT AURA WITHOUT STATUS MIGRAINOSUS, NOT INTRACTABLE: ICD-10-CM

## 2024-01-25 DIAGNOSIS — G44.019 EPISODIC CLUSTER HEADACHE, NOT INTRACTABLE: ICD-10-CM

## 2024-01-25 PROCEDURE — 1160F RVW MEDS BY RX/DR IN RCRD: CPT | Performed by: NURSE PRACTITIONER

## 2024-01-25 PROCEDURE — 99214 OFFICE O/P EST MOD 30 MIN: CPT | Performed by: NURSE PRACTITIONER

## 2024-01-25 PROCEDURE — 1159F MED LIST DOCD IN RCRD: CPT | Performed by: NURSE PRACTITIONER

## 2024-01-25 RX ORDER — CYCLOBENZAPRINE HCL 10 MG
10 TABLET ORAL 3 TIMES DAILY PRN
Qty: 30 TABLET | Refills: 1 | Status: SHIPPED | OUTPATIENT
Start: 2024-01-25

## 2024-01-25 RX ORDER — AMITRIPTYLINE HYDROCHLORIDE 10 MG/1
10 TABLET, FILM COATED ORAL
Qty: 30 TABLET | Refills: 11 | Status: SHIPPED | OUTPATIENT
Start: 2024-01-25

## 2024-01-25 RX ORDER — SUMATRIPTAN 100 MG/1
TABLET, FILM COATED ORAL
Qty: 9 TABLET | Refills: 11 | Status: SHIPPED | OUTPATIENT
Start: 2024-01-25

## 2024-01-25 NOTE — LETTER
2024     BAILEE Callaway  [address] [facility]  [address]  [facility] [address]    Patient: Miky Curiel   YOB: 1965   Date of Visit: 2024     Dear BAILEE Callaway:       Thank you for referring Miky Curiel to me for evaluation. Below are the relevant portions of my assessment and plan of care.    If you have questions, please do not hesitate to call me. I look forward to following Miky along with you.         Sincerely,        DELVIN Jimenez        CC: MD Ray Del Toro MD Mercer, Ashley Moore, APRN  [phone]  Sign when Signing Visit  Subjective:     Patient ID: Miky Curiel is a 58 y.o. male.    CC:   Chief Complaint   Patient presents with   • Migraine       HPI:   History of Present Illness  This visit was completed face to face via VIDEO by Epic/[employer] with verbal consent to treat obtained from patient and/or family.  Provider confirmed the patient's name and  at the start of visit. Patient and/or family confirms that they are located in [place] during visit and Provider is located in [facility]rology Clinic in [place], [place] during visit.    Today 2024-    This is a 58-year-old male who I last saw in clinic on 2023. He called today requesting a video visit due to having car troubles. He does have bilateral carpal tunnel syndrome, and I did refer him to orthopedics for further evaluation. He also has chronic neck pain and I have referred him to pain management, and they have been doing injections. He was previously having chronic migraines. He is taking amitriptyline 10 mg nightly, Emgality monthly for prevention, and uses sumatriptan at onset of symptoms. He takes Flexeril as needed for low back pain. We referred him to internal medicine for management of his hyperlipidemia and hypertension.     He was recently evaluated on 2024 by orthopedics. He has severe bilateral carpal tunnel  "syndrome and has failed conservative therapy. He has opted to move forward with carpal tunnel release.     He is completing a video visit follow-up to obtain refills on medications.     Today, he reports he is doing well. He confirms he is being treated by Dr. Florez for his neck. He received cervical injections in 09/2023, which he feels was helpful. He notes that he is due for another injection because his neck is starting to feel like it is back where it was previously.     He states his headaches are \"few and far between\". He confirms the Emgality is working well for him. He reports that he is still taking amitriptyline 10 mg at bedtime, and Flexeril as needed. He needs a refill on Flexeril. He confirms the Emgality is being filled on time at the pharmacy. He states that he has taken 1 sumatriptan in the last 6 months, which is \"the only thing that stops his migraines\". He denies any cluster type headaches. He reports that he has had 3 headaches in the last year.    He confirms he is scheduled for right carpal tunnel surgery on 02/07/2024.    He denies any new concerns.    Prior Neurology notes and workup:  Migraines & Cluster Type headaches present for years. Has had headaches ongoing for a couple years at least.  He had been told in the past that these were migraine and possibly cluster headaches.  He said that typically when he has a headache he will have pain behind either eye that will start as a throbbing sensation and then spread to holoacranial discomfort.  He at times also has associated photo and phonophobia but he never had any nausea or vomiting. His headaches can be episodic, he may go a month or so without having any headaches, he may have 1 or 2 headaches per month and he may have 3 to 4 in a week.  He had never identified any patterns or triggers to his headaches.  He had not noticed that his headaches are worsened during certain months of the year.  He does at times have watering eyes but he " has this bilaterally as he complains of chronic allergies.  He has been on verapamil for quite some time for both blood pressure and headache control. He had also taken propranolol in the past. He denied any ptosis or facial droop associated with his headaches. Emgality has improved migraines and headaches over 90%      He denied any confusional episodes, seizures, dysarthria or stroke symptoms.      MRI of the brain and MRA brain done and both were read as normal per radiology-completed 6/2/2021 at [facility].       EMG completed 1/11/2022 [facility]- he was complaining of some tingling in his left arm, he had a history of carpal tunnel.  EMG confirms severe carpal tunnel, he saw Ortho who did not feel like his carpal tunnel was symptomatic.  EMG did not show evidence of radiculopathy however he continues to have some discomfort from his neck shooting down to his hand.  Denies weakness of his upper extremities, denies problems with balance or falls.     MRI of the cervical spine without contrast on 04/12/2022 did show multilevel degenerative disc changes, moderate-to-severe cervical spondylosis, also noted 2 mm tiny syrinx around the level of C5. Neurosurgery reviewed and did not recommend surgical intervention at this time. He was referred to Dr. Magdi Musa with neurosurgery and was evaluated on 05/02/2022. He reviewed the MRI of his cervical spine. He referred him to physical therapy as well as pain management. He did review the signs and symptoms of cervical radiculopathy and myelopathy in the case that his symptoms worsened or he needed to follow up with them again.      He was previously seen in clinic on 03/09/2022 by DELVIN Alcantara, who is no longer working in our clinic.    The following portions of the patient's history were reviewed and updated as appropriate: allergies, current medications, past family history, past medical history, past social history, past surgical history, and problem  list.    Past Medical History:   Diagnosis Date   • Cluster headache    • Hyperlipidemia    • Hypertension    • Kidney stone        Past Surgical History:   Procedure Laterality Date   • ANKLE SURGERY     • CYSTOSCOPY BLADDER STONE LITHOTRIPSY         Social History     Socioeconomic History   • Marital status: Single   Tobacco Use   • Smoking status: Light Smoker     Types: Cigars     Passive exposure: Past   • Smokeless tobacco: Never   Vaping Use   • Vaping Use: Never used   Substance and Sexual Activity   • Alcohol use: Not Currently     Comment: 2-3 drinks/week   • Drug use: No   • Sexual activity: Yes       Family History   Problem Relation Age of Onset   • Cancer Mother    • Dementia Father    • Hypertension Father    • Hyperlipidemia Father    • Heart failure Father           Current Outpatient Medications:   •  amitriptyline (ELAVIL) 10 MG tablet, Take 1 tablet by mouth every night at bedtime., Disp: 30 tablet, Rfl: 11  •  cyclobenzaprine (FLEXERIL) 10 MG tablet, Take 1 tablet by mouth 3 (Three) Times a Day As Needed for Muscle Spasms., Disp: 30 tablet, Rfl: 1  •  SUMAtriptan (IMITREX) 100 MG tablet, Take one tablet at onset of headache. May repeat dose one time in 2 hours if headache not relieved., Disp: 9 tablet, Rfl: 11  •  atorvastatin (LIPITOR) 20 MG tablet, Take 1 tablet by mouth Every Night., Disp: , Rfl:   •  chlorthalidone (HYGROTON) 25 MG tablet, Take 1 tablet by mouth Daily., Disp: , Rfl:   •  diclofenac sodium (VOTAREN XR) 100 MG 24 hr tablet, TAKE ONE TABLET BY MOUTH DAILY AS NEEDED FOR SEVERE PAIN, Disp: , Rfl:   •  fluticasone (FLONASE) 50 MCG/ACT nasal spray, 2 sprays by Each Nare route Daily., Disp: , Rfl:   •  galcanezumab-gnlm (EMGALITY) 120 MG/ML auto-injector pen, Inject 1 mL under the skin into the appropriate area as directed Every 30 (Thirty) Days., Disp: 1 mL, Rfl: 11  •  lidocaine (LIDODERM) 5 %, APPLY 1 PATCH AND LEAVE IN PLACE FOR 12 HOURS, THEN REMOVE AND LEAVE OFF FOR 12 HOURS,  Disp: , Rfl:   •  lisinopril (PRINIVIL,ZESTRIL) 20 MG tablet, Take 1 tablet by mouth Daily., Disp: , Rfl:   •  pregabalin (Lyrica) 75 MG capsule, Take 1 capsule by mouth 2 (Two) Times a Day., Disp: 28 capsule, Rfl: 1  •  sodium-potassium-magnesium sulfates (Suprep Bowel Prep Kit) 17.5-3.13-1.6 GM/177ML solution oral solution, Take 2 bottles by mouth Take As Directed., Disp: 354 mL, Rfl: 0  •  verapamil ER (VERELAN) 240 MG 24 hr capsule, Take 1 capsule by mouth Daily., Disp: , Rfl:      Review of Systems   Musculoskeletal:  Positive for back pain and neck pain.   Neurological:  Positive for numbness and headaches.   All other systems reviewed and are negative.       Objective:  There were no vitals taken for this visit.  Not obtained d/t video visit    Neurologic Exam     Mental Status   Oriented to person, place, and time.   Speech: speech is normal   Level of consciousness: alert    Cranial Nerves     CN II   Visual fields full to confrontation.     CN III, IV, VI   Extraocular motions are normal.     CN VII   Facial expression full, symmetric.     CN VIII   CN VIII normal.       Physical Exam  Constitutional:       Appearance: Normal appearance.   Eyes:      Extraocular Movements: EOM normal.   Neurological:      Mental Status: He is alert and oriented to person, place, and time.   Psychiatric:         Mood and Affect: Mood and affect normal.         Speech: Speech normal.     SHLOMO FULLY D/T VIDEO VISIT    Assessment/Plan:       Diagnoses and all orders for this visit:    1. Bilateral carpal tunnel syndrome (Primary)  Comments:  scheduled for right CTS 2/7/2024 with [facility]    2. Chronic migraine without aura without status migrainosus, not intractable  -     amitriptyline (ELAVIL) 10 MG tablet; Take 1 tablet by mouth every night at bedtime.  Dispense: 30 tablet; Refill: 11  -     SUMAtriptan (IMITREX) 100 MG tablet; Take one tablet at onset of headache. May repeat dose one time in 2 hours if headache not  relieved.  Dispense: 9 tablet; Refill: 11    3. Chronic right-sided low back pain with right-sided sciatica  -     cyclobenzaprine (FLEXERIL) 10 MG tablet; Take 1 tablet by mouth 3 (Three) Times a Day As Needed for Muscle Spasms.  Dispense: 30 tablet; Refill: 1    4. Episodic cluster headache, not intractable  -     SUMAtriptan (IMITREX) 100 MG tablet; Take one tablet at onset of headache. May repeat dose one time in 2 hours if headache not relieved.  Dispense: 9 tablet; Refill: 11    5. Spondylolisthesis of cervical region  Comments:  f/u with Dr. Florez pain management for additional injections    He will continue his current treatments, continue with all specialists, follow up with pain management as well as orthopedics. I did refill his medications. Our pharmacy team is going to reach out to him and let him know about their services. He is over 95 percent improved in regard to the migraines and cluster headaches that he was previously having. He tolerates the Emgality very well. He verbalized understanding and agreed with plan today. We will follow up in 7 months in person or sooner if needed.       Reviewed medications, potential side effects and signs and symptoms to report. Discussed risk versus benefits of treatment plan with patient and/or family-including medications, labs and radiology that may be ordered. Addressed questions and concerns during visit. Patient and/or family verbalized understanding and agree with plan.    During this visit the following were done:  Labs Reviewed []    Labs Ordered []    Radiology Reports Reviewed []    Radiology Ordered []    PCP Records Reviewed [x]    Referring Provider Records Reviewed []    ER Records Reviewed []    Hospital Records Reviewed []    History Obtained From Family []    Radiology Images Reviewed []    Other Reviewed [x] Ortho notes, pain management notes   Records Requested []      Transcribed from ambient dictation for DELVIN Jimenez by  Tavia Rubio.  01/25/24   11:04 EST    Patient or patient representative verbalized consent to the visit recording.  I have personally performed the services described in this document as transcribed by the above individual, and it is both accurate and complete.  Lin Triplett Shabana, APRN  1/25/2024  13:38 EST      Note to patient: The 21st Century Cures Act makes medical notes like these available to patients in the interest of transparency. However, be advised this is a medical document. It is intended as peer to peer communication. It is written in medical language and may contain abbreviations or verbiage that are unfamiliar. It may appear blunt or direct. Medical documents are intended to carry relevant information, facts as evident, and the clinical opinion of the provider.

## 2024-01-25 NOTE — PROGRESS NOTES
"Subjective:     Patient ID: Miky Curiel is a 58 y.o. male.    CC:   Chief Complaint   Patient presents with    Migraine       HPI:   History of Present Illness  This visit was completed face to face via VIDEO by Epic/NOAM with verbal consent to treat obtained from patient and/or family.  Provider confirmed the patient's name and  at the start of visit. Patient and/or family confirms that they are located in [place] during visit and Provider is located in [facility]rology Clinic in [place], [place] during visit.    Today 2024-    This is a 58-year-old male who I last saw in clinic on 2023. He called today requesting a video visit due to having car troubles. He does have bilateral carpal tunnel syndrome, and I did refer him to orthopedics for further evaluation. He also has chronic neck pain and I have referred him to pain management, and they have been doing injections. He was previously having chronic migraines. He is taking amitriptyline 10 mg nightly, Emgality monthly for prevention, and uses sumatriptan at onset of symptoms. He takes Flexeril as needed for low back pain. We referred him to internal medicine for management of his hyperlipidemia and hypertension.     He was recently evaluated on 2024 by orthopedics. He has severe bilateral carpal tunnel syndrome and has failed conservative therapy. He has opted to move forward with carpal tunnel release.     He is completing a video visit follow-up to obtain refills on medications.     Today, he reports he is doing well. He confirms he is being treated by Dr. Florez for his neck. He received cervical injections in 2023, which he feels was helpful. He notes that he is due for another injection because his neck is starting to feel like it is back where it was previously.     He states his headaches are \"few and far between\". He confirms the Emgality is working well for him. He reports that he is still taking amitriptyline 10 mg at bedtime, " "and Flexeril as needed. He needs a refill on Flexeril. He confirms the Emgality is being filled on time at the pharmacy. He states that he has taken 1 sumatriptan in the last 6 months, which is \"the only thing that stops his migraines\". He denies any cluster type headaches. He reports that he has had 3 headaches in the last year.    He confirms he is scheduled for right carpal tunnel surgery on 02/07/2024.    He denies any new concerns.    Prior Neurology notes and workup:  Migraines & Cluster Type headaches present for years. Has had headaches ongoing for a couple years at least.  He had been told in the past that these were migraine and possibly cluster headaches.  He said that typically when he has a headache he will have pain behind either eye that will start as a throbbing sensation and then spread to holoacranial discomfort.  He at times also has associated photo and phonophobia but he never had any nausea or vomiting. His headaches can be episodic, he may go a month or so without having any headaches, he may have 1 or 2 headaches per month and he may have 3 to 4 in a week.  He had never identified any patterns or triggers to his headaches.  He had not noticed that his headaches are worsened during certain months of the year.  He does at times have watering eyes but he has this bilaterally as he complains of chronic allergies.  He has been on verapamil for quite some time for both blood pressure and headache control. He had also taken propranolol in the past. He denied any ptosis or facial droop associated with his headaches. Emgality has improved migraines and headaches over 90%      He denied any confusional episodes, seizures, dysarthria or stroke symptoms.      MRI of the brain and MRA brain done and both were read as normal per radiology-completed 6/2/2021 at [facility].       EMG completed 1/11/2022 [facility]- he was complaining of some tingling in his left arm, he had a history of carpal tunnel.  EMG confirms " severe carpal tunnel, he saw Ortho who did not feel like his carpal tunnel was symptomatic.  EMG did not show evidence of radiculopathy however he continues to have some discomfort from his neck shooting down to his hand.  Denies weakness of his upper extremities, denies problems with balance or falls.     MRI of the cervical spine without contrast on 04/12/2022 did show multilevel degenerative disc changes, moderate-to-severe cervical spondylosis, also noted 2 mm tiny syrinx around the level of C5. Neurosurgery reviewed and did not recommend surgical intervention at this time. He was referred to Dr. Magdi Musa with neurosurgery and was evaluated on 05/02/2022. He reviewed the MRI of his cervical spine. He referred him to physical therapy as well as pain management. He did review the signs and symptoms of cervical radiculopathy and myelopathy in the case that his symptoms worsened or he needed to follow up with them again.      He was previously seen in clinic on 03/09/2022 by DELVIN Alcantara, who is no longer working in our clinic.    The following portions of the patient's history were reviewed and updated as appropriate: allergies, current medications, past family history, past medical history, past social history, past surgical history, and problem list.    Past Medical History:   Diagnosis Date    Cluster headache     Hyperlipidemia     Hypertension     Kidney stone        Past Surgical History:   Procedure Laterality Date    ANKLE SURGERY      CYSTOSCOPY BLADDER STONE LITHOTRIPSY         Social History     Socioeconomic History    Marital status: Single   Tobacco Use    Smoking status: Light Smoker     Types: Cigars     Passive exposure: Past    Smokeless tobacco: Never   Vaping Use    Vaping Use: Never used   Substance and Sexual Activity    Alcohol use: Not Currently     Comment: 2-3 drinks/week    Drug use: No    Sexual activity: Yes       Family History   Problem Relation Age of Onset     Cancer Mother     Dementia Father     Hypertension Father     Hyperlipidemia Father     Heart failure Father           Current Outpatient Medications:     amitriptyline (ELAVIL) 10 MG tablet, Take 1 tablet by mouth every night at bedtime., Disp: 30 tablet, Rfl: 11    cyclobenzaprine (FLEXERIL) 10 MG tablet, Take 1 tablet by mouth 3 (Three) Times a Day As Needed for Muscle Spasms., Disp: 30 tablet, Rfl: 1    SUMAtriptan (IMITREX) 100 MG tablet, Take one tablet at onset of headache. May repeat dose one time in 2 hours if headache not relieved., Disp: 9 tablet, Rfl: 11    atorvastatin (LIPITOR) 20 MG tablet, Take 1 tablet by mouth Every Night., Disp: , Rfl:     chlorthalidone (HYGROTON) 25 MG tablet, Take 1 tablet by mouth Daily., Disp: , Rfl:     diclofenac sodium (VOTAREN XR) 100 MG 24 hr tablet, TAKE ONE TABLET BY MOUTH DAILY AS NEEDED FOR SEVERE PAIN, Disp: , Rfl:     fluticasone (FLONASE) 50 MCG/ACT nasal spray, 2 sprays by Each Nare route Daily., Disp: , Rfl:     galcanezumab-gnlm (EMGALITY) 120 MG/ML auto-injector pen, Inject 1 mL under the skin into the appropriate area as directed Every 30 (Thirty) Days., Disp: 1 mL, Rfl: 11    lidocaine (LIDODERM) 5 %, APPLY 1 PATCH AND LEAVE IN PLACE FOR 12 HOURS, THEN REMOVE AND LEAVE OFF FOR 12 HOURS, Disp: , Rfl:     lisinopril (PRINIVIL,ZESTRIL) 20 MG tablet, Take 1 tablet by mouth Daily., Disp: , Rfl:     pregabalin (Lyrica) 75 MG capsule, Take 1 capsule by mouth 2 (Two) Times a Day., Disp: 28 capsule, Rfl: 1    sodium-potassium-magnesium sulfates (Suprep Bowel Prep Kit) 17.5-3.13-1.6 GM/177ML solution oral solution, Take 2 bottles by mouth Take As Directed., Disp: 354 mL, Rfl: 0    verapamil ER (VERELAN) 240 MG 24 hr capsule, Take 1 capsule by mouth Daily., Disp: , Rfl:      Review of Systems   Musculoskeletal:  Positive for back pain and neck pain.   Neurological:  Positive for numbness and headaches.   All other systems reviewed and are negative.        Objective:  There were no vitals taken for this visit.  Not obtained d/t video visit    Neurologic Exam     Mental Status   Oriented to person, place, and time.   Speech: speech is normal   Level of consciousness: alert    Cranial Nerves     CN II   Visual fields full to confrontation.     CN III, IV, VI   Extraocular motions are normal.     CN VII   Facial expression full, symmetric.     CN VIII   CN VIII normal.       Physical Exam  Constitutional:       Appearance: Normal appearance.   Eyes:      Extraocular Movements: EOM normal.   Neurological:      Mental Status: He is alert and oriented to person, place, and time.   Psychiatric:         Mood and Affect: Mood and affect normal.         Speech: Speech normal.     SHLOMO FULLY D/T VIDEO VISIT    Assessment/Plan:       Diagnoses and all orders for this visit:    1. Bilateral carpal tunnel syndrome (Primary)  Comments:  scheduled for right CTS 2/7/2024 with [facility]    2. Chronic migraine without aura without status migrainosus, not intractable  -     amitriptyline (ELAVIL) 10 MG tablet; Take 1 tablet by mouth every night at bedtime.  Dispense: 30 tablet; Refill: 11  -     SUMAtriptan (IMITREX) 100 MG tablet; Take one tablet at onset of headache. May repeat dose one time in 2 hours if headache not relieved.  Dispense: 9 tablet; Refill: 11    3. Chronic right-sided low back pain with right-sided sciatica  -     cyclobenzaprine (FLEXERIL) 10 MG tablet; Take 1 tablet by mouth 3 (Three) Times a Day As Needed for Muscle Spasms.  Dispense: 30 tablet; Refill: 1    4. Episodic cluster headache, not intractable  -     SUMAtriptan (IMITREX) 100 MG tablet; Take one tablet at onset of headache. May repeat dose one time in 2 hours if headache not relieved.  Dispense: 9 tablet; Refill: 11    5. Spondylolisthesis of cervical region  Comments:  f/u with Dr. Florez pain management for additional injections    He will continue his current treatments, continue with all  specialists, follow up with pain management as well as orthopedics. I did refill his medications. Our pharmacy team is going to reach out to him and let him know about their services. He is over 95 percent improved in regard to the migraines and cluster headaches that he was previously having. He tolerates the Emgality very well. He verbalized understanding and agreed with plan today. We will follow up in 7 months in person or sooner if needed.       Reviewed medications, potential side effects and signs and symptoms to report. Discussed risk versus benefits of treatment plan with patient and/or family-including medications, labs and radiology that may be ordered. Addressed questions and concerns during visit. Patient and/or family verbalized understanding and agree with plan.    During this visit the following were done:  Labs Reviewed []    Labs Ordered []    Radiology Reports Reviewed []    Radiology Ordered []    PCP Records Reviewed [x]    Referring Provider Records Reviewed []    ER Records Reviewed []    Hospital Records Reviewed []    History Obtained From Family []    Radiology Images Reviewed []    Other Reviewed [x] Ortho notes, pain management notes   Records Requested []      Transcribed from ambient dictation for DELVIN Jimenez by Tavia Rubio.  01/25/24   11:04 EST    Patient or patient representative verbalized consent to the visit recording.  I have personally performed the services described in this document as transcribed by the above individual, and it is both accurate and complete.  DELVIN Jimenez  1/25/2024  13:38 EST      Note to patient: The 21st Century Cures Act makes medical notes like these available to patients in the interest of transparency. However, be advised this is a medical document. It is intended as peer to peer communication. It is written in medical language and may contain abbreviations or verbiage that are unfamiliar. It may appear blunt or direct.  Medical documents are intended to carry relevant information, facts as evident, and the clinical opinion of the provider.

## 2024-02-07 ENCOUNTER — TELEPHONE (OUTPATIENT)
Dept: ORTHOPEDIC SURGERY | Facility: CLINIC | Age: 59
End: 2024-02-07

## 2024-02-07 DIAGNOSIS — M25.552 LEFT HIP PAIN: ICD-10-CM

## 2024-02-07 NOTE — TELEPHONE ENCOUNTER
Spoke with patient this morning he stated he did not have anyone to drive him home from sx and needed to reschedule. The patient rescheduled his sx for 3/8/24. I did empathize the importance of his sx, and that delaying this would delay his care and overall outcome of recovery. Patient verbalized understanding. We went over the sx process again and he verbalized understanding for that as well, I did let him know the importance of keeping this appointment. Patient expressed gratitude and understanding advised him he could call our office back with any questions or concerns.  Agnes Uribe CMA

## 2024-02-08 RX ORDER — METHYLPREDNISOLONE 4 MG/1
TABLET ORAL
Qty: 21 EACH | OUTPATIENT
Start: 2024-02-08

## 2024-03-08 ENCOUNTER — OUTSIDE FACILITY SERVICE (OUTPATIENT)
Dept: ORTHOPEDIC SURGERY | Facility: CLINIC | Age: 59
End: 2024-03-08

## 2024-05-14 ENCOUNTER — TELEPHONE (OUTPATIENT)
Age: 59
End: 2024-05-14
Payer: MEDICAID

## 2024-05-14 NOTE — TELEPHONE ENCOUNTER
I received a message that patient called wanting to reschedule his surgery with  that was canceled on 3/8/24 due to him not having transportation. After speaking with Dr. Morelos, the patient will need to be scheduled for an office visit  before he can be scheduled for surgery. I called  the patient to relay this information and get him on the schedule, however  his phone did not ring and went to a recorded message stating that he was not available and his mailbox is full, the recorded message then said goodbye and ended the call. Agnes Uribe CMA

## 2024-05-28 ENCOUNTER — OFFICE VISIT (OUTPATIENT)
Dept: FAMILY MEDICINE CLINIC | Facility: CLINIC | Age: 59
End: 2024-05-28
Payer: MEDICAID

## 2024-05-28 VITALS
SYSTOLIC BLOOD PRESSURE: 148 MMHG | BODY MASS INDEX: 21.64 KG/M2 | HEART RATE: 76 BPM | TEMPERATURE: 97.8 F | DIASTOLIC BLOOD PRESSURE: 92 MMHG | OXYGEN SATURATION: 99 % | HEIGHT: 65 IN | WEIGHT: 129.9 LBS

## 2024-05-28 DIAGNOSIS — M54.2 NECK PAIN, BILATERAL: Primary | ICD-10-CM

## 2024-05-28 PROCEDURE — 99213 OFFICE O/P EST LOW 20 MIN: CPT | Performed by: PHYSICIAN ASSISTANT

## 2024-05-28 PROCEDURE — 1125F AMNT PAIN NOTED PAIN PRSNT: CPT | Performed by: PHYSICIAN ASSISTANT

## 2024-05-28 PROCEDURE — 3080F DIAST BP >= 90 MM HG: CPT | Performed by: PHYSICIAN ASSISTANT

## 2024-05-28 PROCEDURE — 3077F SYST BP >= 140 MM HG: CPT | Performed by: PHYSICIAN ASSISTANT

## 2024-05-28 RX ORDER — MELOXICAM 15 MG/1
15 TABLET ORAL DAILY
Qty: 30 TABLET | Refills: 1 | Status: SHIPPED | OUTPATIENT
Start: 2024-05-28

## 2024-05-28 RX ORDER — CYCLOBENZAPRINE HCL 10 MG
10 TABLET ORAL 2 TIMES DAILY PRN
Qty: 30 TABLET | Refills: 1 | Status: SHIPPED | OUTPATIENT
Start: 2024-05-28

## 2024-05-28 NOTE — PROGRESS NOTES
Subjective   Miky Curiel is a 58 y.o. male  Neck Pain (Ongoing neck/back pain, patient of Dr. Bueno ) and Hand Pain (Ongoing hand pain, patient sees  for carpal tunnel  )      History of Present Illness  Patient is a pleasant 58-year-old male who comes in can follow-up on neck pain does see pain management also has hand pain does have a diagnosis of carpal tunnel syndrome and is awaiting surgery is making appointment with hand surgeon  The following portions of the patient's history were reviewed and updated as appropriate: allergies, current medications, past social history and problem list    Review of Systems   Constitutional:  Negative for fatigue and unexpected weight change.   Respiratory:  Negative for cough, chest tightness and shortness of breath.    Cardiovascular:  Negative for chest pain, palpitations and leg swelling.   Gastrointestinal:  Negative for nausea.   Musculoskeletal:  Positive for neck pain.   Skin:  Negative for color change and rash.   Neurological:  Negative for dizziness, syncope, weakness and headaches.       Objective     Vitals:    05/28/24 0939   BP: 148/92   Pulse: 76   Temp: 97.8 °F (36.6 °C)   SpO2: 99%       Physical Exam  Vitals and nursing note reviewed.   Constitutional:       General: He is not in acute distress.     Appearance: Normal appearance. He is well-developed. He is not ill-appearing, toxic-appearing or diaphoretic.   Neck:      Vascular: No carotid bruit or JVD.   Cardiovascular:      Rate and Rhythm: Normal rate and regular rhythm.      Pulses: Normal pulses.      Heart sounds: Normal heart sounds. No murmur heard.  Pulmonary:      Effort: Pulmonary effort is normal. No respiratory distress.      Breath sounds: Normal breath sounds.   Abdominal:      Palpations: Abdomen is soft.      Tenderness: There is no abdominal tenderness.   Skin:     General: Skin is warm and dry.   Neurological:      Mental Status: He is alert.         Assessment &  Plan     Diagnoses and all orders for this visit:    1. Neck pain, bilateral (Primary)  -     meloxicam (Mobic) 15 MG tablet; Take 1 tablet by mouth Daily.  Dispense: 30 tablet; Refill: 1  -     cyclobenzaprine (FLEXERIL) 10 MG tablet; Take 1 tablet by mouth 2 (Two) Times a Day As Needed for Muscle Spasms.  Dispense: 30 tablet; Refill: 1     I spent 15 minutes in patient care: Reviewing records prior to the visit, examining the patient, entering orders and documentation    Part of this note may be an electronic transcription/translation of spoken language to printed text using the Dragon Dictation System.

## 2024-06-03 ENCOUNTER — OFFICE VISIT (OUTPATIENT)
Dept: ORTHOPEDIC SURGERY | Facility: CLINIC | Age: 59
End: 2024-06-03
Payer: MEDICAID

## 2024-06-03 VITALS
HEIGHT: 65 IN | DIASTOLIC BLOOD PRESSURE: 92 MMHG | WEIGHT: 125 LBS | SYSTOLIC BLOOD PRESSURE: 148 MMHG | BODY MASS INDEX: 20.83 KG/M2

## 2024-06-03 DIAGNOSIS — G56.03 BILATERAL CARPAL TUNNEL SYNDROME: Primary | ICD-10-CM

## 2024-06-03 PROCEDURE — 99214 OFFICE O/P EST MOD 30 MIN: CPT | Performed by: STUDENT IN AN ORGANIZED HEALTH CARE EDUCATION/TRAINING PROGRAM

## 2024-06-03 PROCEDURE — 1159F MED LIST DOCD IN RCRD: CPT | Performed by: STUDENT IN AN ORGANIZED HEALTH CARE EDUCATION/TRAINING PROGRAM

## 2024-06-03 PROCEDURE — 3080F DIAST BP >= 90 MM HG: CPT | Performed by: STUDENT IN AN ORGANIZED HEALTH CARE EDUCATION/TRAINING PROGRAM

## 2024-06-03 PROCEDURE — 1160F RVW MEDS BY RX/DR IN RCRD: CPT | Performed by: STUDENT IN AN ORGANIZED HEALTH CARE EDUCATION/TRAINING PROGRAM

## 2024-06-03 PROCEDURE — 3077F SYST BP >= 140 MM HG: CPT | Performed by: STUDENT IN AN ORGANIZED HEALTH CARE EDUCATION/TRAINING PROGRAM

## 2024-06-03 NOTE — PROGRESS NOTES
[facility]     Office Visit       Date: 06/03/2024   Patient Name: Miky Curiel  MRN: [id number]  YOB: 1965    Referring Physician: No ref. provider found     Chief Complaint:   Chief Complaint   Patient presents with    Follow-up     6 month f/u  right carpal tunnel syndrome     History of Present Illness:   Miky Curiel is a 58 y.o. male right-hand-dominant presented clinic for follow-up of bilateral carpal tunnel syndrome.  The patient was scheduled for right carpal tunnel release earlier this year and canceled his surgery twice due to transportation issues.  He presents today to reschedule.  He had no significant change in his symptoms from his prior evaluation.  He continues to endorse numbness and tingling mostly to the thumb index and long fingers, right greater than left.  This is now constant throughout the day and the right hand is becoming more bothersome to him on the left.  He has been wearing nighttime braces with no improvements.  He has had previous injections that have not provided relief.  He reports weakness to the right hand.  No numbness reported to the small and ring fingers.  No other complaints or concerns.    Subjective   Review of Systems:   Review of Systems   Pertinent review of systems per HPI    I reviewed the patient's chief complaint, history of present illness, review of systems, past medical history, surgical history, family history, social history, medications and allergy list in the EMR on 06/03/2024 and agree with the findings above.    Objective    Quality Measures:   ACP:   ACP discussion was declined by the patient.      Tobacco:   Miky Curiel  reports that he has been smoking cigars. He has been exposed to tobacco smoke. He has never used smokeless tobacco.     Vital Signs:   Vitals:    06/03/24 1107   BP: 148/92   Weight: 56.7 kg (125 lb)   Height: 165.1 cm  "(65\")     BMI: BMI is within normal parameters. No other follow-up for BMI required.     General: No acute distress. Alert and oriented.     Ortho Exam:  Examination of bilateral upper extremities demonstrates no deformity.  There is atrophy of the right thenar eminence greater than the left.  No hypothenar atrophy.  The patient is able to make a composite fist and oppose the thumb to small fingers.  The hand and digits are nontender to palpation.  4/5 APB strength bilaterally.  5/5 FDI strength bilaterally.  Positive Durkan's and Phalen's bilaterally positive Tinel's on the right and negative on the left at the wrist.  Negative Tinel's at bilateral elbows.  The ulnar nerve is nontender to palpation with no subluxation.  Sensation is decreased throughout the median nerve distribution of the hands and intact throughout the ulnar nerve distributions of the hand.     Imaging / Studies:    Imaging Results (Last 24 Hours)       ** No results found for the last 24 hours. **        EMG nerve conduction study performed on 1/15/2024 was personally reviewed. This demonstrates severe bilateral carpal tunnel and mild bilateral cubital tunnel.     Assessment / Plan    Assessment/Plan:   Miky Curiel is a 58 y.o. male with bilateral carpal tunnel syndrome.    The patient has failed to improve with conservative measures including bracing, injection, and nerve glides.  He is most interested in definitive treatment.  He would like to reschedule carpal tunnel release on the right side.  We discussed the risks, benefits, alternatives and prognosis, and he elects to proceed with right carpal tunnel release.    The risks and benefits of the procedure were discussed with the patient and/or appropriate guardian, which include but are not limited to: the risk of bleeding, pain, infection, wound complications, neurovascular damage, post-operative stiffness, tendon and/or ligament injury, persistent pain, need for additional surgeries " in the future, and general risks from anesthesia. Carpal tunnel specific risks include: persistent numbness and tingling as the goal of surgery is to prevent further worsening of symptoms, damage to the median nerve and its branches, persistent weakness and pillar pain. We also discussed the post-operative rehabilitation, length of immobilization, the need for therapy, and the overall expected outcomes from the procedure. Proper time was given to answer the patient's questions regarding the procedure. The patient expressed understanding. Knowing what the risks are and what the conservative treatment is, the patient elected to forgo any further conservative treatment options and proceed with the surgical intervention. Surgical consent was obtained in the clinic and signed by myself and the patient. He will follow up with me post operatively.       ICD-10-CM ICD-9-CM   1. Bilateral carpal tunnel syndrome  G56.03 354.0     Follow Up:   Return for Follow Up- After surgery.      Eusebia Morelos MD  [facility] Sur[facility]

## 2024-07-19 DIAGNOSIS — M54.2 NECK PAIN, BILATERAL: ICD-10-CM

## 2024-07-22 RX ORDER — MELOXICAM 15 MG/1
15 TABLET ORAL DAILY
Qty: 30 TABLET | Refills: 1 | Status: SHIPPED | OUTPATIENT
Start: 2024-07-22

## 2024-07-27 ENCOUNTER — HOSPITAL ENCOUNTER (EMERGENCY)
Facility: HOSPITAL | Age: 59
Discharge: HOME | End: 2024-07-28
Attending: EMERGENCY MEDICINE
Payer: MEDICAID

## 2024-07-27 ENCOUNTER — APPOINTMENT (OUTPATIENT)
Dept: ULTRASOUND IMAGING | Facility: HOSPITAL | Age: 59
End: 2024-07-27
Payer: MEDICAID

## 2024-07-27 ENCOUNTER — APPOINTMENT (OUTPATIENT)
Dept: CT IMAGING | Facility: HOSPITAL | Age: 59
End: 2024-07-27
Payer: MEDICAID

## 2024-07-27 DIAGNOSIS — R10.9 FLANK PAIN: ICD-10-CM

## 2024-07-27 DIAGNOSIS — N43.3 HYDROCELE, BILATERAL: Primary | ICD-10-CM

## 2024-07-27 DIAGNOSIS — S30.22XA CONTUSION OF TESTIS, INITIAL ENCOUNTER: ICD-10-CM

## 2024-07-27 LAB
ALBUMIN SERPL-MCNC: 3.7 G/DL (ref 3.5–5.2)
ALBUMIN/GLOB SERPL: 1.2 G/DL
ALP SERPL-CCNC: 79 U/L (ref 39–117)
ALT SERPL W P-5'-P-CCNC: 10 U/L (ref 1–41)
ANION GAP SERPL CALCULATED.3IONS-SCNC: 9 MMOL/L (ref 5–15)
AST SERPL-CCNC: 16 U/L (ref 1–40)
BASOPHILS # BLD AUTO: 0.04 10*3/MM3 (ref 0–0.2)
BASOPHILS NFR BLD AUTO: 0.6 % (ref 0–1.5)
BILIRUB SERPL-MCNC: <0.2 MG/DL (ref 0–1.2)
BUN SERPL-MCNC: 12 MG/DL (ref 8–23)
BUN/CREAT SERPL: 10.8 (ref 7–25)
CALCIUM SPEC-SCNC: 8.7 MG/DL (ref 8.6–10.5)
CHLORIDE SERPL-SCNC: 107 MMOL/L (ref 98–107)
CO2 SERPL-SCNC: 26 MMOL/L (ref 22–29)
CREAT SERPL-MCNC: 1.11 MG/DL (ref 0.76–1.27)
DEPRECATED RDW RBC AUTO: 43.3 FL (ref 37–54)
EGFRCR SERPLBLD CKD-EPI 2021: 76 ML/MIN/1.73
EOSINOPHIL # BLD AUTO: 0.52 10*3/MM3 (ref 0–0.4)
EOSINOPHIL NFR BLD AUTO: 7.3 % (ref 0.3–6.2)
ERYTHROCYTE [DISTWIDTH] IN BLOOD BY AUTOMATED COUNT: 14.8 % (ref 12.3–15.4)
GLOBULIN UR ELPH-MCNC: 3.2 GM/DL
GLUCOSE SERPL-MCNC: 82 MG/DL (ref 65–99)
HCT VFR BLD AUTO: 30.5 % (ref 37.5–51)
HGB BLD-MCNC: 10.2 G/DL (ref 13–17.7)
IMM GRANULOCYTES # BLD AUTO: 0.01 10*3/MM3 (ref 0–0.05)
IMM GRANULOCYTES NFR BLD AUTO: 0.1 % (ref 0–0.5)
LIPASE SERPL-CCNC: 49 U/L (ref 13–60)
LYMPHOCYTES # BLD AUTO: 1.75 10*3/MM3 (ref 0.7–3.1)
LYMPHOCYTES NFR BLD AUTO: 24.6 % (ref 19.6–45.3)
MCH RBC QN AUTO: 27.1 PG (ref 26.6–33)
MCHC RBC AUTO-ENTMCNC: 33.4 G/DL (ref 31.5–35.7)
MCV RBC AUTO: 80.9 FL (ref 79–97)
MONOCYTES # BLD AUTO: 0.83 10*3/MM3 (ref 0.1–0.9)
MONOCYTES NFR BLD AUTO: 11.7 % (ref 5–12)
NEUTROPHILS NFR BLD AUTO: 3.96 10*3/MM3 (ref 1.7–7)
NEUTROPHILS NFR BLD AUTO: 55.7 % (ref 42.7–76)
NRBC BLD AUTO-RTO: 0 /100 WBC (ref 0–0.2)
PLATELET # BLD AUTO: 367 10*3/MM3 (ref 140–450)
PMV BLD AUTO: 9.5 FL (ref 6–12)
POTASSIUM SERPL-SCNC: 4.1 MMOL/L (ref 3.5–5.2)
PROT SERPL-MCNC: 6.9 G/DL (ref 6–8.5)
RBC # BLD AUTO: 3.77 10*6/MM3 (ref 4.14–5.8)
SODIUM SERPL-SCNC: 142 MMOL/L (ref 136–145)
WBC NRBC COR # BLD AUTO: 7.11 10*3/MM3 (ref 3.4–10.8)

## 2024-07-27 PROCEDURE — 93976 VASCULAR STUDY: CPT

## 2024-07-27 PROCEDURE — 80053 COMPREHEN METABOLIC PANEL: CPT | Performed by: EMERGENCY MEDICINE

## 2024-07-27 PROCEDURE — 83690 ASSAY OF LIPASE: CPT | Performed by: EMERGENCY MEDICINE

## 2024-07-27 PROCEDURE — 76870 US EXAM SCROTUM: CPT

## 2024-07-27 PROCEDURE — 85025 COMPLETE CBC W/AUTO DIFF WBC: CPT | Performed by: EMERGENCY MEDICINE

## 2024-07-27 PROCEDURE — 74176 CT ABD & PELVIS W/O CONTRAST: CPT

## 2024-07-27 PROCEDURE — 36415 COLL VENOUS BLD VENIPUNCTURE: CPT

## 2024-07-27 PROCEDURE — 99284 EMERGENCY DEPT VISIT MOD MDM: CPT

## 2024-07-27 RX ORDER — SODIUM CHLORIDE 0.9 % (FLUSH) 0.9 %
10 SYRINGE (ML) INJECTION AS NEEDED
Status: DISCONTINUED | OUTPATIENT
Start: 2024-07-27 | End: 2024-07-28 | Stop reason: HOSPADM

## 2024-07-27 RX ORDER — ONDANSETRON 2 MG/ML
4 INJECTION INTRAMUSCULAR; INTRAVENOUS ONCE
Status: COMPLETED | OUTPATIENT
Start: 2024-07-27 | End: 2024-07-28

## 2024-07-27 RX ORDER — MORPHINE SULFATE 4 MG/ML
4 INJECTION, SOLUTION INTRAMUSCULAR; INTRAVENOUS ONCE
Status: COMPLETED | OUTPATIENT
Start: 2024-07-28 | End: 2024-07-28

## 2024-07-28 VITALS
WEIGHT: 140 LBS | RESPIRATION RATE: 20 BRPM | HEART RATE: 67 BPM | HEIGHT: 65 IN | DIASTOLIC BLOOD PRESSURE: 93 MMHG | SYSTOLIC BLOOD PRESSURE: 187 MMHG | TEMPERATURE: 98.6 F | BODY MASS INDEX: 23.32 KG/M2 | OXYGEN SATURATION: 100 %

## 2024-07-28 LAB
BACTERIA UR QL AUTO: ABNORMAL /HPF
BILIRUB UR QL STRIP: NEGATIVE
CLARITY UR: ABNORMAL
COLOR UR: YELLOW
GLUCOSE UR STRIP-MCNC: NEGATIVE MG/DL
HGB UR QL STRIP.AUTO: ABNORMAL
HYALINE CASTS UR QL AUTO: ABNORMAL /LPF
KETONES UR QL STRIP: ABNORMAL
LEUKOCYTE ESTERASE UR QL STRIP.AUTO: ABNORMAL
NITRITE UR QL STRIP: NEGATIVE
PH UR STRIP.AUTO: 6.5 [PH] (ref 5–8)
PROT UR QL STRIP: ABNORMAL
RBC # UR STRIP: ABNORMAL /HPF
REF LAB TEST METHOD: ABNORMAL
SP GR UR STRIP: 1.02 (ref 1–1.03)
SQUAMOUS #/AREA URNS HPF: ABNORMAL /HPF
UROBILINOGEN UR QL STRIP: ABNORMAL
WBC # UR STRIP: ABNORMAL /HPF

## 2024-07-28 PROCEDURE — 96374 THER/PROPH/DIAG INJ IV PUSH: CPT

## 2024-07-28 PROCEDURE — 87086 URINE CULTURE/COLONY COUNT: CPT | Performed by: EMERGENCY MEDICINE

## 2024-07-28 PROCEDURE — 25010000002 MORPHINE PER 10 MG: Performed by: EMERGENCY MEDICINE

## 2024-07-28 PROCEDURE — 25010000002 ONDANSETRON PER 1 MG: Performed by: EMERGENCY MEDICINE

## 2024-07-28 PROCEDURE — 96375 TX/PRO/DX INJ NEW DRUG ADDON: CPT

## 2024-07-28 PROCEDURE — 25810000003 SODIUM CHLORIDE 0.9 % SOLUTION: Performed by: EMERGENCY MEDICINE

## 2024-07-28 PROCEDURE — 81001 URINALYSIS AUTO W/SCOPE: CPT | Performed by: EMERGENCY MEDICINE

## 2024-07-28 RX ADMIN — SODIUM CHLORIDE 1000 ML: 9 INJECTION, SOLUTION INTRAVENOUS at 00:30

## 2024-07-28 RX ADMIN — MORPHINE SULFATE 4 MG: 4 INJECTION, SOLUTION INTRAMUSCULAR; INTRAVENOUS at 00:33

## 2024-07-28 RX ADMIN — ONDANSETRON 4 MG: 2 INJECTION INTRAMUSCULAR; INTRAVENOUS at 00:30

## 2024-07-28 NOTE — DISCHARGE INSTRUCTIONS
Continue to take medications as directed.  Maintain adequate hydration.  Use acetaminophen or NSAIDs as discussed for pain control.  Follow-up with your primary physician or specialist within the next 24 hours.  Return to the emergency department for any change in symptoms.

## 2024-07-28 NOTE — ED PROVIDER NOTES
Subjective   History of Present Illness  This is a 58-year-old male with a history of hypertension presented to the emergency department some right-sided flank pain.  Patient is had the symptoms for the last 2 weeks.  He has history of kidney stones.  He started getting some flank pain on the right side.  Sharp stabbing.  Radiating to his groin.  Patient states that he also sat down operatively on his motorcycle.  He felt some crunching in his testicles.  Has been having pain since then.  This happened last week.  Denies any hematuria.  He states that he has had some dysuria as well.  Does not have any fevers or chills.  No headache or change in vision.  No focal weakness.  No chest pain or shortness of breath.    History provided by:  Patient   used: No        Review of Systems   Constitutional:  Negative for chills and fever.   HENT:  Negative for congestion, ear pain and sore throat.    Eyes:  Negative for visual disturbance.   Respiratory:  Negative for shortness of breath.    Cardiovascular:  Negative for chest pain.   Gastrointestinal:  Positive for abdominal pain.   Genitourinary:  Positive for dysuria and testicular pain. Negative for difficulty urinating.   Musculoskeletal:  Negative for arthralgias.   Skin:  Negative for rash.   Neurological:  Negative for dizziness, weakness and numbness.   Psychiatric/Behavioral:  Negative for agitation.        Past Medical History:   Diagnosis Date    Cluster headache     Hyperlipidemia     Hypertension     Kidney stone        No Known Allergies    Past Surgical History:   Procedure Laterality Date    ANKLE SURGERY      CYSTOSCOPY BLADDER STONE LITHOTRIPSY         Family History   Problem Relation Age of Onset    Cancer Mother     Dementia Father     Hypertension Father     Hyperlipidemia Father     Heart failure Father        Social History     Socioeconomic History    Marital status: Single   Tobacco Use    Smoking status: Light Smoker     Types:  Cigars     Passive exposure: Past    Smokeless tobacco: Never   Vaping Use    Vaping status: Never Used   Substance and Sexual Activity    Alcohol use: Not Currently     Comment: 2-3 drinks/week    Drug use: No    Sexual activity: Yes           Objective   Physical Exam  Vitals and nursing note reviewed.   Constitutional:       General: He is not in acute distress.     Appearance: He is not ill-appearing or toxic-appearing.   HENT:      Mouth/Throat:      Pharynx: No posterior oropharyngeal erythema.   Eyes:      Extraocular Movements: Extraocular movements intact.      Pupils: Pupils are equal, round, and reactive to light.   Cardiovascular:      Rate and Rhythm: Normal rate and regular rhythm.   Pulmonary:      Effort: Pulmonary effort is normal. No respiratory distress.   Abdominal:      General: Abdomen is flat. There is no distension.      Palpations: There is no mass.      Tenderness: There is no abdominal tenderness. There is right CVA tenderness. There is no guarding or rebound.   Genitourinary:     Penis: Normal.       Comments: Patient is some minor tenderness palpation of the right testicle.  No swelling.  No obvious deformity  Musculoskeletal:         General: No deformity. Normal range of motion.   Neurological:      General: No focal deficit present.      Mental Status: He is alert.      Sensory: No sensory deficit.      Motor: No weakness.         Procedures           ED Course  ED Course as of 07/28/24 0054   Sat Jul 27, 2024 2242 BP(!): 187/93 [JK]   2242 Temp: 98.6 °F (37 °C) [JK]   2242 Temp src: Oral [JK]   2242 Heart Rate: 69 [JK]   2242 Resp: 20 [JK]   2242 SpO2: 100 %  Interpretation:  Patient's repeat vitals, telemetry tracing, and pulse oximetry tracing were directly viewed and interpreted by myself.   O2 sat 100% on room air, interpreted as normal  Telemetry rhythm strip interpreted as normal sinus rhythm, with a rate of 69 bpm. [JK]   [place] Jul 28, 2024   0052 Comprehensive Metabolic Panel  [JK]   0052 Urinalysis With Culture If Indicated - Urine, Clean Catch(!) [JK]   0052 Urinalysis, Microscopic Only - Urine, Clean Catch(!) [JK]   0052 Lipase [JK]   0052 CBC & Differential(!)  Interpretation:  Laboratory studies were reviewed and interpreted directly by myself.  CMP was unremarkable, urinalysis showed some white cells and red cells, no bacteria, lipase normal, CBC showed some chronic anemia with a hemoglobin of 10 [JK]   0053 US Testicular or Ovarian Vascular Limited [JK]   0053 US Scrotum & Testicles [JK]   0053 CT Abdomen Pelvis Without Contrast  Interpretation:  Imaging was directly visualized by myself, per my interpretations, CT abdomen pelvis showed some chronic spinal changes, no acute intra-abdominal process.  Ultrasound scrotum and testicles revealed some bilateral large hydroceles. [JK]   0053 On reevaluation, the patient states that they are feeling much better.  Patient tolerated by mouth challenge of juice and crackers without difficulty.  They are not complaining of any new abdominal pain.  Repeat examination did not show any significant guarding or rebound.  No evidence of peritonitis.  No acute no tenderness.  Patient was given strict return precautions for acute abdomen.  They need to be reevaluated within 24 hours for acute abdomen by PCP or return to the emergency department for reexamination.   [JK]   0053 I had a discussion with the patient/family regarding diagnosis, diagnostic results, treatment plan, and medications. The patient/family indicated understanding of these instructions. I spent adequate time at the bedside prior to discharge necessary to discuss the aftercare instructions, giving patient education, providing explanations of the results of our evaluations/findings, and my decision making to assure that the patient/family understand the plan of care. Time was allotted to answer questions at that time and throughout the ED course. Patient is required to maintain  timely follow up, as discussed. I also discussed the potential for the development of an acute emergent condition requiring further evaluation, return to the ER, admission, or even surgical intervention.  I encouraged the patient to return to the emergency department immediately for any concerns, worsening symptoms, new complaints, or if symptoms persist and they are unable to seek follow-up in a timely fashion. The patient/family expressed understanding and agreement with this plan    Shared decision making:   After full review of the patient's clinical presentation, review of any work-up including but not limited to laboratory studies and radiology obtained, I had a discussion with the patient.  Treatment options were discussed as well as the risks, benefits and consequences.  I discussed all findings with the patient and family members if available.  During the discussion, treatment goals were understood by all as well as any misconceptions which were addressed with the patient.  Ample time was given for any questions they may have had.  They are in agreement with the treatment plan as well as final disposition. [JK]      ED Course User Index  [JK] Theodore Garcia MD                                             Medical Decision Making  This is a 58-year-old male presented to the emergency department with some right flank pain and right scrotal pain.  These have been going on for the last 2 weeks.  Symptoms seem consistent with nephrolithiasis as well as possible scrotal injury.  Patient does not have any obvious vascular deformity at this time.  However I do feel he would benefit from CT scan as well as ultrasound studies.  Overall, the patient is nontoxic.  Afebrile.  IV access was established and the patient.  Placed on continuous telemetry monitoring.  Given the patient's presentation, differential is broad and will require further evaluation.  Workup initiated.      Differential diagnosis: Peptic ulcer  disease, dyspepsia, GERD, pancreatitis, biliary colic, electrolyte abnormality, acute kidney injury, kidney stones, testicular torsion, hematoma, contusion      Amount and/or Complexity of Data Reviewed  External Data Reviewed: labs, radiology, ECG and notes.     Details: External laboratories, imaging as well as notes were reviewed personally by myself.  All relevant studies were used to guide decision making.     Date of previous record: 9/15/2023    Source of note: Primary physician    Summary:  Patient was seen and evaluated for routine visit.  I did review basic laboratory studies on file as well as a previous chest x-ray and EKG.  Records reviewed    Labs: ordered. Decision-making details documented in ED Course.  Radiology: ordered and independent interpretation performed. Decision-making details documented in ED Course.    Risk  Prescription drug management.        Final diagnoses:   Hydrocele, bilateral   Flank pain   Contusion of testis, initial encounter       ED Disposition  ED Disposition       ED Disposition   Discharge    Condition   Stable    Comment   --               Pablo Rios MD  [address] [address]  [phone]    Call in 1 day           Medication List      No changes were made to your prescriptions during this visit.            Theodore Garcia MD  07/28/24 0054

## 2024-07-29 LAB — BACTERIA SPEC AEROBE CULT: NORMAL

## 2024-08-21 RX ORDER — SODIUM, POTASSIUM,MAG SULFATES 17.5-3.13G
2 SOLUTION, RECONSTITUTED, ORAL ORAL TAKE AS DIRECTED
Qty: 354 ML | Refills: 0 | OUTPATIENT
Start: 2024-08-21